# Patient Record
Sex: MALE | Race: WHITE | Employment: OTHER | URBAN - METROPOLITAN AREA
[De-identification: names, ages, dates, MRNs, and addresses within clinical notes are randomized per-mention and may not be internally consistent; named-entity substitution may affect disease eponyms.]

---

## 2017-03-09 ENCOUNTER — HOSPITAL ENCOUNTER (OUTPATIENT)
Dept: PHYSICAL THERAPY | Age: 72
Discharge: HOME | End: 2017-03-09
Payer: MEDICARE

## 2017-03-09 PROCEDURE — G8979 MOBILITY GOAL STATUS: HCPCS

## 2017-03-09 PROCEDURE — 97110 THERAPEUTIC EXERCISES: CPT

## 2017-03-09 PROCEDURE — G8978 MOBILITY CURRENT STATUS: HCPCS

## 2017-03-09 PROCEDURE — 97162 PT EVAL MOD COMPLEX 30 MIN: CPT

## 2017-03-09 NOTE — PROGRESS NOTES
PT DAILY TREATMENT NOTE - [facility] 3-16    Patient Name: Allison Montez  Date:3/9/2017  : 1945  [x]  Patient  Verified  Payor: Radha Guzman / Plan: [place] & B / Product Type: Medicare /    In time:4:10  Out time:4:50  Total Treatment Time (min): 40  Total Timed Codes (min): 8  1:1 Treatment Time ( only): 40   Visit #: 1 of 8    Treatment Area: Right knee pain [M25.561]    SUBJECTIVE  Pain Level (0-10 scale): 1/10  Any medication changes, allergies to medications, adverse drug reactions, diagnosis change, or new procedure performed?: [x] No    [] Yes (see summary sheet for update)  Subjective functional status/changes:   [x] See paper initial evaluation form in patient chart      OBJECTIVE    32 min [x]Eval                  []Re-Eval     8 min Therapeutic Exercise:  [] See flow sheet : HEP given and reviewed with Pt   Rationale: increase ROM and increase strength to improve the patients ability to gain full R knee AROM, increase ease with stairs          With   [x] TE   [] TA   [] neuro   [] other: Patient Education: [x] Review HEP    [] Progressed/Changed HEP based on:   [] positioning   [] body mechanics   [] transfers   [] heat/ice application    [] other:      Other Objective/Functional Measures: FOTO 42 pts     Pain Level (0-10 scale) post treatment: 1/10    ASSESSMENT/Changes in Function:     Patient will continue to benefit from skilled PT services to address functional mobility deficits, address ROM deficits, address strength deficits, analyze and address soft tissue restrictions, analyze and cue movement patterns, analyze and modify body mechanics/ergonomics, address imbalance/dizziness and instruct in home and community integration to attain remaining goals.      [x]  See Plan of Care         PLAN  []  Upgrade activities as tolerated     [x]  Continue plan of care  []  Update interventions per flow sheet       []  Discharge due to:_  []  Other:_      Radames Colmenares, PT 3/9/2017  5:52 PM

## 2017-03-09 NOTE — PROGRESS NOTES
[facility] - [facility]  Carlee Jeffers [address]  [phone] fax  Plan of Care/ Statement of Necessity for Physical Therapy Services    Patient name: Chelsea Berrios Start of Care: 3/9/2017   Referral source: Geovanna Pelletier MD : 1945    Medical Diagnosis: Right knee pain [M25.561]   Onset Date:16 (surgery)    Treatment Diagnosis: Right knee pain, LE Weakness   Prior Hospitalization: see medical history Provider#: [id number]   Medications: Verified on Patient summary List    Comorbidities: Heart disease - quadruple bypass surgery   Prior Level of Function: lives in [facility] with spouse;       The Plan of Care and following information is based on the information from the initial evaluation. Assessment/ key information: Pt is a 67 y.o. male who presents with c/o limited R knee AROM, mild pain with increased activity, and R LE weakness. On 16, Pt slipped on icy porch step and fell. Pt went to urgent care where x-rays were negative for fx. Pt called and consulted with Orthopedic surgeon and was dx with quadriceps tendon rupture. Surgery was performed on 16. Pt was on crutches x 7 wks then in post-op brace, WBAT. Pt now amb without brace, FWB and quadricep is fully healed. Pt continues to exhibit R quadriceps atrophy and is limited in R knee flex AROM. Pt has difficulty ascending/descending stairs, getting in/out of cars, and standing/amb prolonged periods of time. Upon exam, Pt exhibited fascial restrictions in R quadriceps, HS mm; inflexibility in same muscles; visible muscle atrophy; hypomobility of R patella; mild R knee jt effusion; taut R ITB; limited R knee AROM of 0-90 deg compared to 0-120 deg L; inability to perform single leg HR on R LE without increased compensation; instability with R SLS; and difficulty with double leg squatting.   Pt would benefit from skilled PT to address above deficits to improve Pt's function without difficulty or pain. Evaluation Complexity History MEDIUM  Complexity : 1-2 comorbidities / personal factors will impact the outcome/ POC ; Examination MEDIUM Complexity : 3 Standardized tests and measures addressing body structure, function, activity limitation and / or participation in recreation  ;Presentation MEDIUM Complexity : Evolving with changing characteristics  ; Clinical Decision Making MEDIUM Complexity : FOTO score of 26-74  Overall Complexity Rating: MEDIUM  Problem List: pain affecting function, decrease ROM, decrease strength, edema affecting function, impaired gait/ balance, decrease ADL/ functional abilitiies, decrease activity tolerance and decrease flexibility/ joint mobility   Treatment Plan may include any combination of the following: Therapeutic exercise, Therapeutic activities, Neuromuscular re-education, Physical agent/modality, Gait/balance training, Manual therapy and Patient education  Patient / Family readiness to learn indicated by: asking questions, trying to perform skills and interest  Persons(s) to be included in education: patient (P)  Barriers to Learning/Limitations: None  Patient Goal (s): Strength and range of motion.   Patient Self Reported Health Status: good  Rehabilitation Potential: good    Short Term Goals: To be accomplished in 1 weeks:  Goal: Pt to be compliant with initial HEP to improve R knee AROM. Status at last note/certification: Established    Long Term Goals: To be accomplished in 4 weeks:  Goal: Pt to increase R knee AROM to 0-110 deg or > to increase ease with stair negotiation and transfers in/out of car. Status at last note/certification: 2-98 deg R knee AROM  Goal: Pt to perform full, pain free double leg squat to increase ease with picking up objects from floor.   Status at last note/certification: unable to perform without mild pain, deviations  Goal: Pt to perform R SLS x 30 sec on foam without LOB to increase ease with stair negotiation. Status at last note/certification: instability, unable to perform  Goal: Pt to perform 15 single leg heel raises R LE without deviations to increase ease with household chores. Status at last note/certification: unable to perform without compensations, UE support    Frequency / Duration: Patient to be seen 2 times per week for 4 weeks. Patient/ Caregiver education and instruction: Diagnosis, prognosis, exercises   [x]  Plan of care has been reviewed with PTA    G-Codes (GP)  Mobility   Current  CK= 40-59%   Goal  CJ= 20-39%    The severity rating is based on clinical judgment and the FOTO score. Certification Period: 3/9/17 - 4/7/17    Renita Wolfe, PT 3/9/2017 5:55 PM  _____________________________________________________________________  I certify that the above Therapy Services are being furnished while the patient is under my care. I agree with the treatment plan and certify that this therapy is necessary.     Physician's Signature:____________________  Date:__________Time:______    Please sign and return to [facility] - [address]  [phone] fax

## 2017-03-14 ENCOUNTER — HOSPITAL ENCOUNTER (OUTPATIENT)
Dept: PHYSICAL THERAPY | Age: 72
Discharge: HOME | End: 2017-03-14
Payer: MEDICARE

## 2017-03-14 PROCEDURE — 97110 THERAPEUTIC EXERCISES: CPT

## 2017-03-14 PROCEDURE — 97140 MANUAL THERAPY 1/> REGIONS: CPT

## 2017-03-14 NOTE — PROGRESS NOTES
PT DAILY TREATMENT NOTE - [facility]     Patient Name: Keara Counter  Date:3/14/2017  : 1945  [x]  Patient  Verified  Payor: Ghulamteddy Honey / Plan: [place] MEDICARE PART A & B / Product Type: Medicare /    In time:4:38  Out time:5:30  Total Treatment Time (min): 52  Total Timed Codes (min): 52  1:1 Treatment Time ( [address] only): 46   Visit #: 2 of 8    Treatment Area: Right knee pain [M25.561]    SUBJECTIVE  Pain Level (0-10 scale): 1/10  Any medication changes, allergies to medications, adverse drug reactions, diagnosis change, or new procedure performed?: [x] No    [] Yes (see summary sheet for update)  Subjective functional status/changes:   [] No changes reported  \"I am fine. We just moved here so we are still unpacking and I was doing a lot of setting up yesterday and that kind of strained the thigh. \"    OBJECTIVE    37 min Therapeutic Exercise:  [] See flow sheet :   Rationale: increase ROM and increase strength to improve the patients ability to have full knee flex AROM with movement    15 min Manual Therapy:  STM R quad, ITB; patellar mobs, post tibiofemoral glides   Rationale: decrease pain, increase ROM, increase tissue extensibility and decrease trigger points to normalize ROM, gait          With   [] TE   [] TA   [] neuro   [] other: Patient Education: [x] Review HEP    [] Progressed/Changed HEP based on:   [] positioning   [] body mechanics   [] transfers   [] heat/ice application    [] other:      Other Objective/Functional Measures: Initiated Rx program per flow sheet. Pt given VCs for proper technique of all exercises. Pain Level (0-10 scale) post treatment: 3/10    ASSESSMENT/Changes in Function: Pt noted no increase in pain or strain of R quad. Will continue to progress program to increase ROM, strength.     Patient will continue to benefit from skilled PT services to address functional mobility deficits, address ROM deficits, address strength deficits, analyze and address soft tissue restrictions, analyze and cue movement patterns, analyze and modify body mechanics/ergonomics, address imbalance/dizziness and instruct in home and community integration to attain remaining goals. []  See Plan of Care  []  See progress note/recertification  []  See Discharge Summary         Progress towards goals / Updated goals:  Short Term Goals: To be accomplished in 1 weeks:  Goal: Pt to be compliant with initial HEP to improve R knee AROM. Status at last note/certification: Established  Current status: met  Long Term Goals: To be accomplished in 4 weeks:  Goal: Pt to increase R knee AROM to 0-110 deg or > to increase ease with stair negotiation and transfers in/out of car. Status at last note/certification: 8-72 deg R knee AROM  Goal: Pt to perform full, pain free double leg squat to increase ease with picking up objects from floor. Status at last note/certification: unable to perform without mild pain, deviations  Goal: Pt to perform R SLS x 30 sec on foam without LOB to increase ease with stair negotiation. Status at last note/certification: instability, unable to perform  Goal: Pt to perform 15 single leg heel raises R LE without deviations to increase ease with household chores.   Status at last note/certification: unable to perform without compensations, UE support    PLAN  []  Upgrade activities as tolerated     [x]  Continue plan of care  []  Update interventions per flow sheet       []  Discharge due to:_  []  Other:_      Renita Wolfe, PT 3/14/2017  6:50 PM    Future Appointments  Date Time Provider Avis Greco   3/17/2017 3:30 PM Tavares Berry, PT [facility]   3/21/2017 4:00 PM Renita Wolfe PT [facility]   3/24/2017 3:30 PM Tavares Berry, PT [facility]   3/27/2017 3:00  [address] [facility]   3/31/2017 3:30 PM Tavares Berry, PT [facility]

## 2017-03-17 ENCOUNTER — HOSPITAL ENCOUNTER (OUTPATIENT)
Dept: PHYSICAL THERAPY | Age: 72
Discharge: HOME | End: 2017-03-17
Payer: MEDICARE

## 2017-03-17 PROCEDURE — 97110 THERAPEUTIC EXERCISES: CPT

## 2017-03-17 PROCEDURE — 97140 MANUAL THERAPY 1/> REGIONS: CPT

## 2017-03-17 NOTE — PROGRESS NOTES
PT DAILY TREATMENT NOTE - [facility]     Patient Name: Whitney Walton  Date:3/17/2017  : 1945  [x]  Patient  Verified  Payor: Carleen Escamilla / Plan: [place] MEDICARE PART A & B / Product Type: Medicare /    In time:335  Out time:434  Total Treatment Time (min): 59  Total Timed Codes (min): 49  1:1 Treatment Time ( [address] only): 27  Visit #: 3 of 8    Treatment Area: Right knee pain [M25.561]    SUBJECTIVE  Pain Level (0-10 scale): 1  Any medication changes, allergies to medications, adverse drug reactions, diagnosis change, or new procedure performed?: [x] No    [] Yes (see summary sheet for update)  Subjective functional status/changes:   [] No changes reported  I don't usually have pain.     OBJECTIVE    Modality rationale: decrease inflammation and decrease pain to improve the patients ability to improve activity tolerance   Min Type Additional Details    [] Estim:  []Unatt       []IFC  []Premod                        []Other:  []w/ice   []w/heat  Position:  Location:    [] Estim: []Att    []TENS instruct  []NMES                    []Other:  []w/US   []w/ice   []w/heat  Position:  Location:    []  Traction: [] Cervical       []Lumbar                       [] Prone          []Supine                       []Intermittent   []Continuous Lbs:  [] before manual  [] after manual    []  Ultrasound: []Continuous   [] Pulsed                           []1MHz   []3MHz W/cm2:  Location:    []  Iontophoresis with dexamethasone         Location: [] Take home patch   [] In clinic   10 [x]  Ice     []  heat  []  Ice massage  []  Laser   []  Anodyne Position:supine  Location:R knee    []  Laser with stim  []  Other:  Position:  Location:    []  Vasopneumatic Device Pressure:       [] lo [] med [] hi   Temperature: [] lo [] med [] hi   [] Skin assessment post-treatment:  []intact []redness- no adverse reaction    []redness - adverse reaction:     39 min Therapeutic Exercise:  [x] See flow sheet :   Rationale: increase ROM and increase strength to improve the patients ability to improve ease of mobility     10 min Manual Therapy:  STM Right distal quads, TFL/ITB, inferior patellar mobs/posterior tibiofemoral joint mobs for flexion   Rationale: decrease pain, increase ROM, increase tissue extensibility and decrease trigger points to normalize gait      With   [] TE   [] TA   [] neuro   [] other: Patient Education: [x] Review HEP    [] Progressed/Changed HEP based on:   [] positioning   [] body mechanics   [] transfers   [] heat/ice application    [] other:      Other Objective/Functional Measures: Right knee flexion AROM after manual: 107     Pain Level (0-10 scale) post treatment: 1    ASSESSMENT/Changes in Function: Patient demonstrates gains in ROM. Good stability with BOSU lunges without UE support. Patient will continue to benefit from skilled PT services to modify and progress therapeutic interventions, address functional mobility deficits, address ROM deficits, address strength deficits, analyze and address soft tissue restrictions, analyze and cue movement patterns, analyze and modify body mechanics/ergonomics, assess and modify postural abnormalities, address imbalance/dizziness and instruct in home and community integration to attain remaining goals. []  See Plan of Care  []  See progress note/recertification  []  See Discharge Summary         Progress towards goals / Updated goals:  Short Term Goals: To be accomplished in 1 weeks:  Goal: Pt to be compliant with initial HEP to improve R knee AROM. Status at last note/certification: Established  Current status: met  Long Term Goals: To be accomplished in 4 weeks:  Goal: Pt to increase R knee AROM to 0-110 deg or > to increase ease with stair negotiation and transfers in/out of car.   Status at last note/certification: 5-57 deg R knee AROM   Current status: Progressing, 0-107  Goal: Pt to perform full, pain free double leg squat to increase ease with picking up objects from floor.  Status at last note/certification: unable to perform without mild pain, deviations  Current status: Met  Goal: Pt to perform R SLS x 30 sec on foam without LOB to increase ease with stair negotiation. Status at last note/certification: instability, unable to perform  Current status: Progressing, unable for more than 1-2 seconds  Goal: Pt to perform 15 single leg heel raises R LE without deviations to increase ease with household chores.   Status at last note/certification: unable to perform without compensations, UE support  Current status: Unable, unable to perform full heel raise    PLAN  [x]  Upgrade activities as tolerated     [x]  Continue plan of care  []  Update interventions per flow sheet       []  Discharge due to:_  []  Other:_      Roxanne Barahona PT 3/17/2017  1:36 PM    Future Appointments  Date Time Provider Avis Greco   3/17/2017 3:30 PM Roxanne Barahona PT [facility]   3/21/2017 4:00 PM Renita Wolfe [facility]   3/24/2017 3:30  [address] [facility]   3/27/2017 3:00  [address] [facility]   3/31/2017 3:30 PM Roxanne Barahona [facility]

## 2017-03-21 ENCOUNTER — HOSPITAL ENCOUNTER (OUTPATIENT)
Dept: PHYSICAL THERAPY | Age: 72
Discharge: HOME | End: 2017-03-21
Payer: MEDICARE

## 2017-03-21 PROCEDURE — 97140 MANUAL THERAPY 1/> REGIONS: CPT

## 2017-03-21 PROCEDURE — 97110 THERAPEUTIC EXERCISES: CPT

## 2017-03-21 NOTE — PROGRESS NOTES
PT DAILY TREATMENT NOTE - [facility]     Patient Name: Samantha Gomez  Date:3/21/2017  : 1945  [x]  Patient  Verified  Payor: Do Toribio / Plan: [place] MEDICARE PART A & B / Product Type: Medicare /    In time:4:00  Out time:5:10  Total Treatment Time (min): 70  Total Timed Codes (min): 60  1:1 Treatment Time ( [address] only): 60  Visit #: 4 of 8    Treatment Area: Right knee pain [M25.561]    SUBJECTIVE  Pain Level (0-10 scale): 1/10  Any medication changes, allergies to medications, adverse drug reactions, diagnosis change, or new procedure performed?: [x] No    [] Yes (see summary sheet for update)  Subjective functional status/changes:   [] No changes reported  \"I did too much yesterday. I worked on it twice yesterday. I think from now on I will do the workout just once a day because it causes my leg to be really sore. \"    OBJECTIVE    Modality rationale: decrease inflammation and decrease pain to improve the patients ability to improve activity tolerance   Min Type Additional Details    [] Estim:  []Unatt       []IFC  []Premod                        []Other:  []w/ice   []w/heat  Position:  Location:    [] Estim: []Att    []TENS instruct  []NMES                    []Other:  []w/US   []w/ice   []w/heat  Position:  Location:    []  Traction: [] Cervical       []Lumbar                       [] Prone          []Supine                       []Intermittent   []Continuous Lbs:  [] before manual  [] after manual    []  Ultrasound: []Continuous   [] Pulsed                           []1MHz   []3MHz W/cm2:  Location:    []  Iontophoresis with dexamethasone         Location: [] Take home patch   [] In clinic   10 [x]  Ice     []  heat  []  Ice massage  []  Laser   []  Anodyne Position:supine  Location:R knee    []  Laser with stim  []  Other:  Position:  Location:    []  Vasopneumatic Device Pressure:       [] lo [] med [] hi   Temperature: [] lo [] med [] hi   [] Skin assessment post-treatment:  []intact []redness- no adverse reaction    []redness - adverse reaction:     50 min Therapeutic Exercise:  [x] See flow sheet :   Rationale: increase ROM and increase strength to improve the patients ability to improve ease of mobility     10 min Manual Therapy:  STM R distal quad, ITB, HS; post tibiofemoral glides; patellar mobs   Rationale: decrease pain, increase ROM, increase tissue extensibility and decrease trigger points to normalize gait      With   [] TE   [] TA   [] neuro   [] other: Patient Education: [x] Review HEP    [] Progressed/Changed HEP based on:   [] positioning   [] body mechanics   [] transfers   [] heat/ice application    [] other:      Other Objective/Functional Measures: R knee AROM 0-109 deg    Pain Level (0-10 scale) post treatment: 0/10    ASSESSMENT/Changes in Function: Pt able to perform all exercises in standing except step ups without UE support and good stability. Pt continues to exhibit gains in R knee AROM. Patient will continue to benefit from skilled PT services to modify and progress therapeutic interventions, address functional mobility deficits, address ROM deficits, address strength deficits, analyze and address soft tissue restrictions, analyze and cue movement patterns, analyze and modify body mechanics/ergonomics, assess and modify postural abnormalities, address imbalance/dizziness and instruct in home and community integration to attain remaining goals. []  See Plan of Care  []  See progress note/recertification  []  See Discharge Summary         Progress towards goals / Updated goals:  Short Term Goals: To be accomplished in 1 weeks:  Goal: Pt to be compliant with initial HEP to improve R knee AROM. Status at last note/certification: Established  Current status: met  Long Term Goals: To be accomplished in 4 weeks:  Goal: Pt to increase R knee AROM to 0-110 deg or > to increase ease with stair negotiation and transfers in/out of car.   Status at last note/certification: 4-06 deg R knee AROM   Current status: progressing - R knee 0-109 deg  Goal: Pt to perform full, pain free double leg squat to increase ease with picking up objects from floor. Status at last note/certification: unable to perform without mild pain, deviations  Current status: Met  Goal: Pt to perform R SLS x 30 sec on foam without LOB to increase ease with stair negotiation. Status at last note/certification: instability, unable to perform  Current status: Progressing, unable for more than 1-2 seconds  Goal: Pt to perform 15 single leg heel raises R LE without deviations to increase ease with household chores.   Status at last note/certification: unable to perform without compensations, UE support  Current status: not met - unable to perform full heel raise    PLAN  [x]  Upgrade activities as tolerated     [x]  Continue plan of care  []  Update interventions per flow sheet       []  Discharge due to:_  []  Other:_      Renita Wolfe, PT 3/21/2017  1:36 PM    Future Appointments  Date Time Provider Avis Greco   3/23/2017 12:00  [address] [facility]   3/27/2017 3:00  [address] [facility]   3/31/2017 3:30 PM Camilo Varela, PT [facility]

## 2017-03-23 ENCOUNTER — HOSPITAL ENCOUNTER (OUTPATIENT)
Dept: PHYSICAL THERAPY | Age: 72
Discharge: HOME | End: 2017-03-23
Payer: MEDICARE

## 2017-03-23 PROCEDURE — 97140 MANUAL THERAPY 1/> REGIONS: CPT

## 2017-03-23 PROCEDURE — 97110 THERAPEUTIC EXERCISES: CPT

## 2017-03-23 NOTE — PROGRESS NOTES
PT DAILY TREATMENT NOTE - [facility] 316    Patient Name: Julianne Camarillo  Date:3/23/2017  : 1945  [x]  Patient  Verified  Payor: Monik Holland / Plan: [place] & [place] / Product Type: Medicare /    In time:12:10  Out time: 1;05  Total Treatment Time (min): 55  Total Timed Codes (min): 45  1:1 Treatment Time ( [address] only): 30   Visit #: 5 of 8    Treatment Area: Right knee pain [M25.561]    SUBJECTIVE  Pain Level (0-10 scale):  0  Any medication changes, allergies to medications, adverse drug reactions, diagnosis change, or new procedure performed?: [x] No    [] Yes (see summary sheet for update)  Subjective functional status/changes:   [] No changes reported  Stairs are still difficulty, especially going down steps    OBJECTIVE    Modality rationale: decrease pain to improve the patients ability to tolerate  activity   Min Type Additional Details    [] Estim:  []Unatt       []IFC  []Premod                        []Other:  []w/ice   []w/heat  Position:  Location:    [] Estim: []Att    []TENS instruct  []NMES                    []Other:  []w/US   []w/ice   []w/heat  Position:  Location:    []  Traction: [] Cervical       []Lumbar                       [] Prone          []Supine                       []Intermittent   []Continuous Lbs:  [] before manual  [] after manual    []  Ultrasound: []Continuous   [] Pulsed                           []1MHz   []3MHz Location:  W/cm2:    []  Iontophoresis with dexamethasone         Location: [] Take home patch   [] In clinic   10 [x]  Ice     []  heat  []  Ice massage  []  Laser   []  Anodyne Position: supine  Location: R knee    []  Laser with stim  []  Other: Position:  Location:    []  Vasopneumatic Device Pressure:       [] lo [] med [] hi   Temperature: [] lo [] med [] hi   [x] Skin assessment post-treatment:  [x]intact []redness- no adverse reaction    []redness - adverse reaction:       35 min Therapeutic Exercise:  [] See flow sheet :   Rationale: increase ROM and increase strength to improve the patients ability to walk and negotiate stairs    10 min Manual Therapy:  STM to distal quad, ITB,  Manual stretching patellar mobs   Rationale: increase ROM and increase tissue extensibility to improve ease with stairs         With   [] TE   [] TA   [] neuro   [] other: Patient Education: [x] Review HEP    [] Progressed/Changed HEP based on:   [] positioning   [] body mechanics   [] transfers   [] heat/ice application    [] other:      Other Objective/Functional Measures:      Pain Level (0-10 scale) post treatment:  0    ASSESSMENT/Changes in Function: decreased tightness in distal quad and ITB. Eccentric quad strength improving with decreased UE support required    Patient will continue to benefit from skilled PT services to modify and progress therapeutic interventions, address functional mobility deficits, address ROM deficits, address strength deficits, analyze and address soft tissue restrictions, analyze and cue movement patterns, analyze and modify body mechanics/ergonomics, assess and modify postural abnormalities, address imbalance/dizziness and instruct in home and community integration to attain remaining goals. []  See Plan of Care  []  See progress note/recertification  []  See Discharge Summary         Progress towards goals / Updated goals:  Goal: Pt to be compliant with initial HEP to improve R knee AROM. Status at last note/certification: Established  Current status: met  Long Term Goals: To be accomplished in 4 weeks:  Goal: Pt to increase R knee AROM to 0-110 deg or > to increase ease with stair negotiation and transfers in/out of car. Status at last note/certification: 2-05 deg R knee AROM   Current status: progressing - R knee 0-109 deg  Goal: Pt to perform full, pain free double leg squat to increase ease with picking up objects from floor.   Status at last note/certification: unable to perform without mild pain, deviations  Current status: Met  Goal: Pt to perform R SLS x 30 sec on foam without LOB to increase ease with stair negotiation. Status at last note/certification: instability, unable to perform  Current status: Progressing, unable for more than 1-2 seconds  Goal: Pt to perform 15 single leg heel raises R LE without deviations to increase ease with household chores.   Status at last note/certification: unable to perform without compensations, UE support  Current status: not met - unable to perform full heel raise    PLAN  [x]  Upgrade activities as tolerated     []  Continue plan of care  []  Update interventions per flow sheet       []  Discharge due to:_  []  Other:_      Zofia Tinsley, PTA 3/23/2017  12:15 PM

## 2017-03-24 ENCOUNTER — APPOINTMENT (OUTPATIENT)
Dept: PHYSICAL THERAPY | Age: 72
End: 2017-03-24
Payer: MEDICARE

## 2017-03-27 ENCOUNTER — HOSPITAL ENCOUNTER (OUTPATIENT)
Dept: PHYSICAL THERAPY | Age: 72
Discharge: HOME | End: 2017-03-27
Payer: MEDICARE

## 2017-03-27 PROCEDURE — 97110 THERAPEUTIC EXERCISES: CPT

## 2017-03-27 PROCEDURE — 97140 MANUAL THERAPY 1/> REGIONS: CPT

## 2017-03-27 NOTE — PROGRESS NOTES
PT DAILY TREATMENT NOTE     Patient Name: Heaven Michaud  Date:3/27/2017  : 1945  [x]  Patient  Verified  Payor: Isabel Jacques / Plan: [place] MEDICARE PART A & B / Product Type: Medicare /    In time:3:00  Out time: 4:05  Total Treatment Time (min): 65  Total Timed Codes (min): 55  1:1 Treatment Time ( [address] only): 30   Visit #: 6 of 8    Treatment Area: Right knee pain [M25.561]    SUBJECTIVE  Pain Level (0-10 scale): 1  Any medication changes, allergies to medications, adverse drug reactions, diagnosis change, or new procedure performed?: [x] No    [] Yes (see summary sheet for update)  Subjective functional status/changes:   [] No changes reported  I'm having some more soreness in my knee, not bad though.     OBJECTIVE    Modality rationale: decrease inflammation and decrease pain to improve the patients ability to tolerate activity   Min Type Additional Details    [] Estim:  []Unatt       []IFC  []Premod                        []Other:  []w/ice   []w/heat  Position:  Location:    [] Estim: []Att    []TENS instruct  []NMES                    []Other:  []w/US   []w/ice   []w/heat  Position:  Location:    []  Traction: [] Cervical       []Lumbar                       [] Prone          []Supine                       []Intermittent   []Continuous Lbs:  [] before manual  [] after manual    []  Ultrasound: []Continuous   [] Pulsed                           []1MHz   []3MHz W/cm2:  Location:    []  Iontophoresis with dexamethasone         Location: [] Take home patch   [] In clinic   10 [x]  Ice     []  heat  []  Ice massage  []  Laser   []  Anodyne Position: supine  Location: R knee    []  Laser with stim  []  Other:  Position:  Location:    []  Vasopneumatic Device Pressure:       [] lo [] med [] hi   Temperature: [] lo [] med [] hi   [x] Skin assessment post-treatment:  [x]intact []redness- no adverse reaction    []redness - adverse reaction:       45 min Therapeutic Exercise:  [] See flow sheet :   Rationale: increase ROM and increase strength to improve the patients ability to walk and negotiate stairs    10 min Manual Therapy:  STM to distal ITB and Quad,  TF mobs   Rationale: decrease pain, increase ROM and increase tissue extensibility to improve ease of walking       With   [] TE   [] TA   [] neuro   [] other: Patient Education: [x] Review HEP    [] Progressed/Changed HEP based on:   [] positioning   [] body mechanics   [] transfers   [] heat/ice application    [] other:      Other Objective/Functional Measures:      Pain Level (0-10 scale) post treatment:  0    ASSESSMENT/Changes in Function: Continues to demo instability with SL  HR and unable to do without Significant UE support. Patient will continue to benefit from skilled PT services to modify and progress therapeutic interventions, address functional mobility deficits, address ROM deficits, address strength deficits, analyze and address soft tissue restrictions, analyze and cue movement patterns, analyze and modify body mechanics/ergonomics, assess and modify postural abnormalities, address imbalance/dizziness and instruct in home and community integration to attain remaining goals. []  See Plan of Care  []  See progress note/recertification  []  See Discharge Summary         Progress towards goals / Updated goals:  Goal: Pt to be compliant with initial HEP to improve R knee AROM. Status at last note/certification: Established  Current status: met  Long Term Goals: To be accomplished in 4 weeks:  Goal: Pt to increase R knee AROM to 0-110 deg or > to increase ease with stair negotiation and transfers in/out of car. Status at last note/certification: 5-43 deg R knee AROM   Current status: progressing - R knee 0-109 deg  Goal: Pt to perform full, pain free double leg squat to increase ease with picking up objects from floor.   Status at last note/certification: unable to perform without mild pain, deviations  Current status: Met  Goal: Pt to perform R SLS x 30 sec on foam without LOB to increase ease with stair negotiation. Status at last note/certification: instability, unable to perform  Current status: Progressing, unable for more than 1-2 seconds  Goal: Pt to perform 15 single leg heel raises R LE without deviations to increase ease with household chores.   Status at last note/certification: unable to perform without compensations, UE support  Current status: not met - unable to perform full heel raise    PLAN  [x]  Upgrade activities as tolerated     []  Continue plan of care  []  Update interventions per flow sheet       []  Discharge due to:_  []  Other:_      Zofia Newnicholas, PTA 3/27/2017  3:54 PM    Future Appointments  Date Time Provider Avis Greco   3/31/2017 3:30 PM Rohit Dodge, [facility]

## 2017-03-28 ENCOUNTER — APPOINTMENT (OUTPATIENT)
Dept: PHYSICAL THERAPY | Age: 72
End: 2017-03-28
Payer: MEDICARE

## 2017-03-31 ENCOUNTER — HOSPITAL ENCOUNTER (OUTPATIENT)
Dept: PHYSICAL THERAPY | Age: 72
Discharge: HOME | End: 2017-03-31
Payer: MEDICARE

## 2017-03-31 PROCEDURE — 97140 MANUAL THERAPY 1/> REGIONS: CPT

## 2017-03-31 PROCEDURE — G8978 MOBILITY CURRENT STATUS: HCPCS

## 2017-03-31 PROCEDURE — 97110 THERAPEUTIC EXERCISES: CPT

## 2017-03-31 PROCEDURE — G8979 MOBILITY GOAL STATUS: HCPCS

## 2017-03-31 NOTE — PROGRESS NOTES
[facility] - [place] [address] [address]  [phone] fax    Continued Plan of Care/ Re-certification for Physical Therapy Services      Patient name: Ashley Agrawal Start of Care: 3/9/2017   Referral source: Kentrell Domínguez MD : 1945    Medical Diagnosis: Right knee pain [M25.561] Onset Date:16 (surgery)    Treatment Diagnosis: Right knee pain, LE Weakness   Prior Hospitalization: see medical history Provider#: [id number]   Medications: Verified on Patient summary List   Comorbidities: Heart disease - quadruple bypass surgery  Prior Level of Function: lives in [facility] with spouse;     Visits from Start of Care: 7    Missed Visits: 0    The Plan of Care and following information is based on the patient's current status:  Goal: Pt to be compliant with initial HEP to improve R knee AROM. Status at last note/certification: Established  Current status: met  Long Term Goals: To be accomplished in 4 weeks:  Goal: Pt to increase R knee AROM to 0-110 deg or > to increase ease with stair negotiation and transfers in/out of car. Status at last note/certification: 2-54 deg R knee AROM   Current status: Met, 0-114  Goal: Pt to perform full, pain free double leg squat to increase ease with picking up objects from floor. Status at last note/certification: unable to perform without mild pain, deviations  Current status: Met  Goal: Pt to perform R SLS x 30 sec on foam without LOB to increase ease with stair negotiation. Status at last note/certification: instability, unable to perform  Current status: Progressing, unable for 30 seconds  Goal: Pt to perform 15 single leg heel raises R LE without deviations to increase ease with household chores.   Status at last note/certification: unable to perform without compensations, UE support  Current status: Progressing, limited heel clearance from floor however similar height as noninvolved LE    Key functional changes:   Functional Gains: improved ROM, limited pain, improved mobility/ambulation  Functional Deficits: some difficulty with stairs especially with descending, some difficulty with transfers (more reliance on Left LE), limits standing/walking times subconsciously   % improvement: 70%  Pain   Average: 0-1/10       Best: 0/10     Worst: 1/10  Patient Goal: \"Walk up and down stairs\"      Problems/ barriers to goal attainment: none     Problem List: pain affecting function, decrease ROM, decrease strength, edema affecting function, impaired gait/ balance, decrease ADL/ functional abilitiies, decrease activity tolerance, decrease flexibility/ joint mobility and decrease transfer abilities    Treatment Plan: Therapeutic exercise, Therapeutic activities, Neuromuscular re-education, Physical agent/modality, Gait/balance training, Manual therapy, Aquatic therapy, Patient education, Self Care training, Functional mobility training, Home safety training and Stair training     Goals for this certification period to be accomplished in 10 treatments:  Goal: Patient will improve FOTO assessment score to 62 in order to indicate improved functional abilities. Status at last note/certification: 48  Goal: Patient will report no challenge with negotiating stairs with reciprocal step pattern with use of handrail in order to progress toward personal goals. Status at last note/certification: Step to  Goal: Patient will report no limitations to standing/ambulating/transferring due to Right knee pain/weakness in order to progress toward premorbid status. Status at last note/certification: Limits stand/amb, reports some difficulty with transfers    Frequency / Duration: Patient to be seen 2 times per week for 5 weeks:    Assessment / Recommendations:Patient progressing well after quad tendon repair. Improved Right knee ROM and strength.  Will benefit from continued skilled physical therapy in order to progress knee stability and balance and to improve ease of stair negotiation. G-Codes (GP)  Mobility  [id number] Current  CK= 40-59%  [id number] Goal  CJ= 20-39%    The severity rating is based on clinical judgment and the FOTO score. Certification Period: 4/1/17 to 4/30/17    Dala , PT 3/31/2017 1:41 PM    ________________________________________________________________________  I certify that the above Therapy Services are being furnished while the patient is under my care. I agree with the treatment plan and certify that this therapy is necessary. [] I have read the above and request that my patient continue as recommended.   [] I have read the above report and request that my patient continue therapy with the following changes/special instructions: ______________________________________  [] I have read the above report and request that my patient be discharged from therapy    Physician's Signature:_______________________________Date:___________Time:__________  Please sign and return to [facility] - [address]  [phone] fax

## 2017-03-31 NOTE — PROGRESS NOTES
PT DAILY TREATMENT NOTE - [facility]     Patient Name: Dewey Burnham  Date:3/31/2017  : 1945  [x]  Patient  Verified  Payor: Misty Childers / Plan: [place] MEDICARE PART A & B / Product Type: Medicare /    In time:325  Out time:431  Total Treatment Time (min): 66  Total Timed Codes (min): 56  1:1 Treatment Time ( [address] only): 64   Visit #: 7 of 8    Treatment Area: Right knee pain [M25.561]    SUBJECTIVE  Pain Level (0-10 scale): 1  Any medication changes, allergies to medications, adverse drug reactions, diagnosis change, or new procedure performed?: [x] No    [] Yes (see summary sheet for update)  Subjective functional status/changes:   [] No changes reported  I'm still having trouble with stairs.      OBJECTIVE    Modality rationale: decrease inflammation and decrease pain to improve the patients ability to improve activity tolerance   Min Type Additional Details    [] Estim:  []Unatt       []IFC  []Premod                        []Other:  []w/ice   []w/heat  Position:  Location:    [] Estim: []Att    []TENS instruct  []NMES                    []Other:  []w/US   []w/ice   []w/heat  Position:  Location:    []  Traction: [] Cervical       []Lumbar                       [] Prone          []Supine                       []Intermittent   []Continuous Lbs:  [] before manual  [] after manual    []  Ultrasound: []Continuous   [] Pulsed                           []1MHz   []3MHz W/cm2:  Location:    []  Iontophoresis with dexamethasone         Location: [] Take home patch   [] In clinic   10 [x]  Ice     []  heat  []  Ice massage  []  Laser   []  Anodyne Position:supine with wedge  Location:R knee    []  Laser with stim  []  Other:  Position:  Location:    []  Vasopneumatic Device Pressure:       [] lo [] med [] hi   Temperature: [] lo [] med [] hi   [] Skin assessment post-treatment:  []intact []redness- no adverse reaction    []redness - adverse reaction:     48 min Therapeutic Exercise:  [x] See flow sheet :   Rationale: increase ROM and increase strength to improve the patients ability to improve stand/amb tolerance    8 min Manual Therapy:  STM distal Right quads; TF mobs for flexion   Rationale: decrease pain, increase ROM, increase tissue extensibility and decrease trigger points to normalize gait          With   [] TE   [] TA   [] neuro   [] other: Patient Education: [x] Review HEP    [] Progressed/Changed HEP based on:   [] positioning   [] body mechanics   [] transfers   [] heat/ice application    [] other:      Other Objective/Functional Measures: Right knee flexion AROM 114     Pain Level (0-10 scale) post treatment: 1    ASSESSMENT/Changes in Function: Progressing well with ROM. Able to negotiate 4 steps with reciprocal step pattern; states he typically uses step to. Encouraged him to perform reciprocal when able, alternating with step to when sore, using HR in order to progress knee stability. Patient will continue to benefit from skilled PT services to modify and progress therapeutic interventions, address functional mobility deficits, address ROM deficits, address strength deficits, analyze and address soft tissue restrictions, analyze and cue movement patterns, analyze and modify body mechanics/ergonomics, assess and modify postural abnormalities, address imbalance/dizziness and instruct in home and community integration to attain remaining goals. []  See Plan of Care  []  See progress note/recertification  []  See Discharge Summary         Progress towards goals / Updated goals:  Goal: Pt to be compliant with initial HEP to improve R knee AROM. Status at last note/certification: Established  Current status: met  Long Term Goals: To be accomplished in 4 weeks:  Goal: Pt to increase R knee AROM to 0-110 deg or > to increase ease with stair negotiation and transfers in/out of car.   Status at last note/certification: 1-36 deg R knee AROM   Current status: Met, 0-114  Goal: Pt to perform full, pain free double leg squat to increase ease with picking up objects from floor. Status at last note/certification: unable to perform without mild pain, deviations  Current status: Met  Goal: Pt to perform R SLS x 30 sec on foam without LOB to increase ease with stair negotiation. Status at last note/certification: instability, unable to perform  Current status: Progressing, unable for 30 seconds  Goal: Pt to perform 15 single leg heel raises R LE without deviations to increase ease with household chores.   Status at last note/certification: unable to perform without compensations, UE support  Current status: Progressing, limited heel clearance from floor however similar height as noninvolved LE    PLAN  [x]  Upgrade activities as tolerated     [x]  Continue plan of care  []  Update interventions per flow sheet       []  Discharge due to:_  []  Other:_      Carol Fowler PT 3/31/2017  1:40 PM    Future Appointments  Date Time Provider Avis Greco   3/31/2017 3:30 PM Carol Fowler PT [facility]

## 2017-04-03 ENCOUNTER — HOSPITAL ENCOUNTER (OUTPATIENT)
Dept: PHYSICAL THERAPY | Age: 72
Discharge: HOME | End: 2017-04-03
Payer: MEDICARE

## 2017-04-03 PROCEDURE — 97140 MANUAL THERAPY 1/> REGIONS: CPT

## 2017-04-03 PROCEDURE — 97110 THERAPEUTIC EXERCISES: CPT

## 2017-04-03 NOTE — PROGRESS NOTES
PT DAILY TREATMENT NOTE - [facility] 316    Patient Name: Taylor Ganser  Date:4/3/2017  : 1945  [x]  Patient  Verified  Payor: Adama Chamber / Plan: [place] MEDICARE PART A & B / Product Type: Medicare /    In time:5:00  Out time: 5;50  Total Treatment Time (min): 50  Total Timed Codes (min): 40  1:1 Treatment Time ( only): 40   Visit #: 1 of 10    Treatment Area: Right knee pain [M25.561]    SUBJECTIVE  Pain Level (0-10 scale): 0  Any medication changes, allergies to medications, adverse drug reactions, diagnosis change, or new procedure performed?: [x] No    [] Yes (see summary sheet for update)  Subjective functional status/changes:   [] No changes reported  I think I did too many steps yesterday.     OBJECTIVE    Modality rationale: decrease pain to improve the patients ability to improve activity tolerance   Min Type Additional Details    [] Estim:  []Unatt       []IFC  []Premod                        []Other:  []w/ice   []w/heat  Position:  Location:    [] Estim: []Att    []TENS instruct  []NMES                    []Other:  []w/US   []w/ice   []w/heat  Position:  Location:    []  Traction: [] Cervical       []Lumbar                       [] Prone          []Supine                       []Intermittent   []Continuous Lbs:  [] before manual  [] after manual    []  Ultrasound: []Continuous   [] Pulsed                           []1MHz   []3MHz Location:  W/cm2:    []  Iontophoresis with dexamethasone         Location: [] Take home patch   [] In clinic   10 [x]  Ice     []  heat  []  Ice massage  []  Laser   []  Anodyne Position: supine  Location: R distal quad    []  Laser with stim  []  Other: Position:  Location:    []  Vasopneumatic Device Pressure:       [] lo [] med [] hi   Temperature: [] lo [] med [] hi   [x] Skin assessment post-treatment:  [x]intact []redness- no adverse reaction    []redness - adverse reaction:       32 min Therapeutic Exercise:  [] See flow sheet :   Rationale: increase ROM and increase strength to improve the patients ability to walk and negotiate stairs    8 min Manual Therapy:  STM to R distal quad, ITB   Rationale: increase ROM and increase tissue extensibility to improve ease with gait       With   [] TE   [] TA   [] neuro   [] other: Patient Education: [x] Review HEP    [] Progressed/Changed HEP based on:   [] positioning   [] body mechanics   [] transfers   [] heat/ice application    [] other:      Other Objective/Functional Measures:      Pain Level (0-10 scale) post treatment: 0    ASSESSMENT/Changes in Function:  Continued to be challenged with stair descent due to quad, LE weakness    Patient will continue to benefit from skilled PT services to modify and progress therapeutic interventions, address functional mobility deficits, address ROM deficits, address strength deficits, analyze and address soft tissue restrictions, analyze and cue movement patterns, analyze and modify body mechanics/ergonomics, assess and modify postural abnormalities, address imbalance/dizziness and instruct in home and community integration to attain remaining goals. []  See Plan of Care  []  See progress note/recertification  []  See Discharge Summary         Progress towards goals / Updated goals:  Goal: Patient will improve FOTO assessment score to 62 in order to indicate improved functional abilities. Status at last note/certification: 48  Goal: Patient will report no challenge with negotiating stairs with reciprocal step pattern with use of handrail in order to progress toward personal goals. Status at last note/certification: Step to  Goal: Patient will report no limitations to standing/ambulating/transferring due to Right knee pain/weakness in order to progress toward premorbid status.   Status at last note/certification: Limits stand/amb, reports some difficulty with transfers     PLAN  [x]  Upgrade activities as tolerated     []  Continue plan of care  []  Update interventions per flow sheet       []  Discharge due to:_  []  Other:_      Hoang Catherine PTA 4/3/2017  5:04 PM

## 2017-04-06 ENCOUNTER — HOSPITAL ENCOUNTER (OUTPATIENT)
Dept: PHYSICAL THERAPY | Age: 72
Discharge: HOME | End: 2017-04-06
Payer: MEDICARE

## 2017-04-06 PROCEDURE — 97110 THERAPEUTIC EXERCISES: CPT

## 2017-04-06 PROCEDURE — 97140 MANUAL THERAPY 1/> REGIONS: CPT

## 2017-04-06 NOTE — PROGRESS NOTES
PT DAILY TREATMENT NOTE - [facility] 316    Patient Name: Josie Madden  Date:2017  : 1945  [x]  Patient  Verified  Payor: [place] MEDICARE / Plan: [place] MEDICARE PART A & B / Product Type: Medicare /    In time:2:30  Out time:3;25  Total Treatment Time (min): 55  Total Timed Codes (min): 45  1:1 Treatment Time ( [address] only): 39   Visit #: 2 of 10    Treatment Area: Right knee pain [M25.561]    SUBJECTIVE  Pain Level (0-10 scale): 1  Any medication changes, allergies to medications, adverse drug reactions, diagnosis change, or new procedure performed?: [x] No    [] Yes (see summary sheet for update)  Subjective functional status/changes:   [] No changes reported  I did a lot of walking at the mall yesterday  That might be why I ave more soreness    OBJECTIVE    Modality rationale: decrease pain to improve the patients ability to improve activity tolerance   Min Type Additional Details    [] Estim:  []Unatt       []IFC  []Premod                        []Other:  []w/ice   []w/heat  Position:  Location:    [] Estim: []Att    []TENS instruct  []NMES                    []Other:  []w/US   []w/ice   []w/heat  Position:  Location:    []  Traction: [] Cervical       []Lumbar                       [] Prone          []Supine                       []Intermittent   []Continuous Lbs:  [] before manual  [] after manual    []  Ultrasound: []Continuous   [] Pulsed                           []1MHz   []3MHz Location:  W/cm2:    []  Iontophoresis with dexamethasone         Location: [] Take home patch   [] In clinic   10 [x]  Ice     []  heat  []  Ice massage  []  Laser   []  Anodyne Position: supine  Location: R knee    []  Laser with stim  []  Other: Position:  Location:    []  Vasopneumatic Device Pressure:       [] lo [] med [] hi   Temperature: [] lo [] med [] hi   [x] Skin assessment post-treatment:  [x]intact []redness- no adverse reaction    []redness - adverse reaction:       37 min Therapeutic Exercise:  [] See flow sheet : Rationale: increase ROM and increase strength to improve the patients ability to walk and negotiate stairs    8 min Manual Therapy:  STM to distal quad, HS   Rationale: increase ROM and increase tissue extensibility to improve ease of gait          With   [] TE   [] TA   [] neuro   [] other: Patient Education: [x] Review HEP    [] Progressed/Changed HEP based on:   [] positioning   [] body mechanics   [] transfers   [] heat/ice application    [] other:      Other Objective/Functional Measures:   Increased step ups to 20x     Pain Level (0-10 scale) post treatment:  0    ASSESSMENT/Changes in Function: Soreness with prolonged walking, but not limiting tolerance     Patient will continue to benefit from skilled PT services to modify and progress therapeutic interventions, address functional mobility deficits, address ROM deficits, address strength deficits, analyze and address soft tissue restrictions, analyze and cue movement patterns, analyze and modify body mechanics/ergonomics, assess and modify postural abnormalities, address imbalance/dizziness and instruct in home and community integration to attain remaining goals. []  See Plan of Care  []  See progress note/recertification  []  See Discharge Summary         Progress towards goals / Updated goals:  Goal: Patient will improve FOTO assessment score to 62 in order to indicate improved functional abilities. Status at last note/certification: 48  Goal: Patient will report no challenge with negotiating stairs with reciprocal step pattern with use of handrail in order to progress toward personal goals. Status at last note/certification: Step to  Continuing to negotiate stairs with step to pattern  Not met  Goal: Patient will report no limitations to standing/ambulating/transferring due to Right knee pain/weakness in order to progress toward premorbid status.   Status at last note/certification: Limits stand/amb, reports some difficulty with transfers     PLAN  [x]  Upgrade activities as tolerated     []  Continue plan of care  []  Update interventions per flow sheet       []  Discharge due to:_  []  Other:_      [place], CHRISSIE 4/6/2017  2:39 PM

## 2017-04-10 ENCOUNTER — APPOINTMENT (OUTPATIENT)
Dept: PHYSICAL THERAPY | Age: 72
End: 2017-04-10
Payer: MEDICARE

## 2017-04-13 ENCOUNTER — HOSPITAL ENCOUNTER (OUTPATIENT)
Dept: PHYSICAL THERAPY | Age: 72
End: 2017-04-13
Payer: MEDICARE

## 2017-04-17 ENCOUNTER — APPOINTMENT (OUTPATIENT)
Dept: PHYSICAL THERAPY | Age: 72
End: 2017-04-17
Payer: MEDICARE

## 2017-04-20 ENCOUNTER — APPOINTMENT (OUTPATIENT)
Dept: PHYSICAL THERAPY | Age: 72
End: 2017-04-20
Payer: MEDICARE

## 2017-04-24 ENCOUNTER — APPOINTMENT (OUTPATIENT)
Dept: PHYSICAL THERAPY | Age: 72
End: 2017-04-24
Payer: MEDICARE

## 2017-04-27 ENCOUNTER — APPOINTMENT (OUTPATIENT)
Dept: PHYSICAL THERAPY | Age: 72
End: 2017-04-27
Payer: MEDICARE

## 2017-07-17 ENCOUNTER — APPOINTMENT (OUTPATIENT)
Dept: PHYSICAL THERAPY | Age: 72
End: 2017-07-17

## 2017-07-25 ENCOUNTER — HOSPITAL ENCOUNTER (OUTPATIENT)
Dept: PHYSICAL THERAPY | Age: 72
Discharge: HOME | End: 2017-07-25
Payer: MEDICARE

## 2017-07-25 PROCEDURE — 97110 THERAPEUTIC EXERCISES: CPT

## 2017-07-25 PROCEDURE — 97162 PT EVAL MOD COMPLEX 30 MIN: CPT

## 2017-07-25 PROCEDURE — G8982 BODY POS GOAL STATUS: HCPCS

## 2017-07-25 PROCEDURE — G8981 BODY POS CURRENT STATUS: HCPCS

## 2017-07-25 NOTE — PROGRESS NOTES
[facility] - [facility], [address] [place] [address]  [phone] fax  Plan of Care/ Statement of Necessity for Physical Therapy Services    Patient name: Mio Alcazar Start of Care: 2017   Referral source: Anil Rosales MD : 1945    Medical Diagnosis: Right leg pain [M79.604]  Left leg pain [M79.605]   Onset Date:One month ago    Treatment Diagnosis: Right Knee Pain   Prior Hospitalization: see medical history Provider#: [id number]   Medications: Verified on Patient summary List    Comorbidities: Heart disease; Depression; Right quadriceps tendon rupture/surgery 2016   Prior Level of Function: Lives in [facility] with spouse; ; functionally independent      The Plan of Care and following information is based on the information from the initial evaluation. Assessment/ key information: Pt is a 67 y.o. male who presents with c/o insidious onset of R knee stiffness that limits R knee mobility and function in the past month. Pt has hx of R quadriceps tendon rupture and corrective surgery in 2016. Pt finished a course of PT in 2017 but noticed after painting kitchen - climbing up/down ladder - and increased amb, stair negotiation with out of town trips that discomfort has increased. Pt has difficulty performing yard work, negotiating the stairs in his home, and bending down to  objects. Upon exam, Pt exhibited palpable tautness to distal quad, articularis genu, and distal ITB; limited R knee AROM of 0-106 deg compared to 0-122 deg L; med/lat instability with double leg squatting and SLS. Pt would benefit from skilled PT to address above deficits to improve Pt's function and ability to return to prior level of function without stiffness or pain.     Evaluation Complexity History MEDIUM  Complexity : 1-2 comorbidities / personal factors will impact the outcome/ POC ; Examination MEDIUM Complexity : 3 Standardized tests and measures addressing body structure, function, activity limitation and / or participation in recreation  ;Presentation MEDIUM Complexity : Evolving with changing characteristics  ; Clinical Decision Making MEDIUM Complexity : FOTO score of 26-74  Overall Complexity Rating: MEDIUM  Problem List: pain affecting function, decrease ROM, decrease strength, impaired gait/ balance, decrease ADL/ functional abilitiies, decrease activity tolerance, decrease flexibility/ joint mobility and decrease transfer abilities   Treatment Plan may include any combination of the following: Therapeutic exercise, Therapeutic activities, Neuromuscular re-education, Physical agent/modality, Gait/balance training, Manual therapy and Patient education  Patient / Family readiness to learn indicated by: asking questions, trying to perform skills and interest  Persons(s) to be included in education: patient (P)  Barriers to Learning/Limitations: None  Patient Goal (s): Walking; stairs; strength.   Patient Self Reported Health Status: good  Rehabilitation Potential: good    Short Term Goals: To be accomplished in 1 weeks:  Goal: Pt to be compliant with initial HEP to improve R knee AROM. Status at last note/certification: Established    Long Term Goals: To be accomplished in 4 weeks:  Goal: Pt to increase R knee AROM of 0-122 deg to increase ease with negotiating stairs at home. Status at last note/certification: R knee 0-106 deg  Goal: Pt to perform 5 double leg squats without compensations or pain to bend and  objects. Status at last note/certification: instability and discomfort with squat  Goal: Pt to report 0/10 pain on average to perform yard work without difficulty. Status at last note/certification: 6/76 pain at worst  Goal: Pt to report FOTO score of 61 pts to be able to perform reciprocal pattern on stairs at home.   Status at last note/certification: FOTO 41 pts     Frequency / Duration: Patient to be seen 2 times per week for 4 weeks. Patient/ Caregiver education and instruction: Diagnosis, prognosis, exercises   [x]  Plan of care has been reviewed with CHRISSIE    G-Codes (GP)  Position   Current  CK= 40-59%   Goal  CJ= 20-39%    The severity rating is based on clinical judgment and the FOTO score. Certification Period: 7/25/17 - 8/23/17    Renita Wolfe, PT 7/25/2017 5:07 PM  _____________________________________________________________________  I certify that the above Therapy Services are being furnished while the patient is under my care. I agree with the treatment plan and certify that this therapy is necessary.     Physician's Signature:____________________  Date:__________Time:______    Please sign and return to [facility] - [address]  [phone] fax

## 2017-07-25 NOTE — PROGRESS NOTES
PT DAILY TREATMENT NOTE - [facility] 316    Patient Name: Flakita Paulino  Date:2017  : 1945  [x]  Patient  Verified  Payor: Wilian Plan / Plan: VA MEDICARE PART A & B / Product Type: Medicare /    In time:4:30  Out time:5:00  Total Treatment Time (min): 30  Total Timed Codes (min): 8  1:1 Treatment Time ( [address] only): 30   Visit #: 1 of 8    Treatment Area: Right leg pain [M79.604]  Left leg pain [M79.605]    SUBJECTIVE  Pain Level (0-10 scale): 1/10  Any medication changes, allergies to medications, adverse drug reactions, diagnosis change, or new procedure performed?: [x] No    [] Yes (see summary sheet for update)  Subjective functional status/changes:   [x] See paper initial evaluation form in patient chart      OBJECTIVE    22 min [x]Eval                  []Re-Eval     8 min Therapeutic Exercise:  [] See flow sheet : HEP given and reviewed with Pt   Rationale: increase ROM to improve the patients ability to reduce stiffness with R knee movement          With   [x] TE   [] TA   [] neuro   [] other: Patient Education: [x] Review HEP    [] Progressed/Changed HEP based on:   [] positioning   [] body mechanics   [] transfers   [x] heat/ice application    [] other:      Other Objective/Functional Measures: FOTO 41 pts     Pain Level (0-10 scale) post treatment: 1/10    ASSESSMENT/Changes in Function:     Patient will continue to benefit from skilled PT services to address functional mobility deficits, address ROM deficits, address strength deficits, analyze and address soft tissue restrictions, analyze and cue movement patterns, analyze and modify body mechanics/ergonomics and instruct in home and community integration to attain remaining goals.      [x]  See Plan of Care         PLAN  []  Upgrade activities as tolerated     [x]  Continue plan of care  []  Update interventions per flow sheet       []  Discharge due to:_  []  Other:_      Renita Wolfe, PT 2017  5:05 PM

## 2017-07-28 ENCOUNTER — HOSPITAL ENCOUNTER (OUTPATIENT)
Dept: PHYSICAL THERAPY | Age: 72
End: 2017-07-28
Payer: MEDICARE

## 2017-08-01 ENCOUNTER — HOSPITAL ENCOUNTER (OUTPATIENT)
Dept: PHYSICAL THERAPY | Age: 72
End: 2017-08-01
Payer: MEDICARE

## 2017-08-04 ENCOUNTER — HOSPITAL ENCOUNTER (OUTPATIENT)
Dept: PHYSICAL THERAPY | Age: 72
Discharge: HOME | End: 2017-08-04
Payer: MEDICARE

## 2017-08-04 PROCEDURE — 97112 NEUROMUSCULAR REEDUCATION: CPT

## 2017-08-04 PROCEDURE — 97035 APP MDLTY 1+ULTRASOUND EA 15: CPT

## 2017-08-04 NOTE — PROGRESS NOTES
PT DAILY TREATMENT NOTE - [facility]     Patient Name: Leoncio Duque  Date:2017  : 1945  [x]  Patient  Verified  Payor: Wilian Plan / Plan: [place] MEDICARE PART A & B / Product Type: Medicare /    In time:400  Out time:440  Total Treatment Time (min): 40  Total Timed Codes (min): 40  1:1 Treatment Time ( [address] only): 25  Visit #: 2 of 8    Treatment Area: Right leg pain [M79.604]  Left leg pain [M79.605]    SUBJECTIVE  Pain Level (0-10 scale): 1  Any medication changes, allergies to medications, adverse drug reactions, diagnosis change, or new procedure performed?: [x] No    [] Yes (see summary sheet for update)  Subjective functional status/changes:   [] No changes reported  It's alright.      OBJECTIVE    Modality rationale: decrease inflammation and decrease pain to improve the patients ability to improve activity tolerance   Min Type Additional Details    [] Estim:  []Unatt       []IFC  []Premod                        []Other:  []w/ice   []w/heat  Position:  Location:    [] Estim: []Att    []TENS instruct  []NMES                    []Other:  []w/US   []w/ice   []w/heat  Position:  Location:    []  Traction: [] Cervical       []Lumbar                       [] Prone          []Supine                       []Intermittent   []Continuous Lbs:  [] before manual  [] after manual   8 [x]  Ultrasound: []Continuous   [x] Pulsed                           []1MHz   [x]3MHz W/cm2:1.1  Location:R distal quad    []  Iontophoresis with dexamethasone         Location: [] Take home patch   [] In clinic    []  Ice     []  heat  []  Ice massage  []  Laser   []  Anodyne Position:  Location:    []  Laser with stim  []  Other:  Position:  Location:    []  Vasopneumatic Device Pressure:       [] lo [] med [] hi   Temperature: [] lo [] med [] hi   [] Skin assessment post-treatment:  []intact []redness- no adverse reaction    []redness - adverse reaction:     32 min Neuromuscular Re-education:  []  See flow sheet :   Rationale: increase strength, improve coordination, improve balance and increase proprioception  to improve the patients ability to improve ease of mobility       With   [] TE   [] TA   [] neuro   [] other: Patient Education: [x] Review HEP    [] Progressed/Changed HEP based on:   [] positioning   [] body mechanics   [] transfers   [] heat/ice application    [] other:      Other Objective/Functional Measures: initiated treatment per flow sheet     Pain Level (0-10 scale) post treatment: 1    ASSESSMENT/Changes in Function: Wife states that Patient has not been motivated to improve recently and has not been doing exercises. Couple is concerned about his treatment and whether or not it will be aggressive enough. Discussed with Patient that he will not notice an improvement unless he is compliant. Patient will continue to benefit from skilled PT services to modify and progress therapeutic interventions, address functional mobility deficits, address ROM deficits, address strength deficits, analyze and address soft tissue restrictions, analyze and cue movement patterns, analyze and modify body mechanics/ergonomics, assess and modify postural abnormalities, address imbalance/dizziness and instruct in home and community integration to attain remaining goals. []  See Plan of Care  []  See progress note/recertification  []  See Discharge Summary         Progress towards goals / Updated goals:  Short Term Goals: To be accomplished in 1 weeks:  Goal: Pt to be compliant with initial HEP to improve R knee AROM. Status at last note/certification: Established     Long Term Goals: To be accomplished in 4 weeks:  Goal: Pt to increase R knee AROM of 0-122 deg to increase ease with negotiating stairs at home. Status at last note/certification: R knee 0-106 deg  Goal: Pt to perform 5 double leg squats without compensations or pain to bend and  objects.   Status at last note/certification: instability and discomfort with squat  Goal: Pt to report 0/10 pain on average to perform yard work without difficulty. Status at last note/certification: 1/71 pain at worst  Goal: Pt to report FOTO score of 61 pts to be able to perform reciprocal pattern on stairs at home.   Status at last note/certification: FOTO 41 pts     PLAN  []  Upgrade activities as tolerated     [x]  Continue plan of care  []  Update interventions per flow sheet       []  Discharge due to:_  []  Other:_      Kane Fuller, PT 8/4/2017  1:34 PM    Future Appointments  Date Time Provider Avis Greco   8/4/2017 4:00 PM Kane Fuller, [facility]   8/8/2017 4:00 PM Renita Wolfe, [facility]   8/10/2017 4:00 PM Renita Wolfe, [facility]   8/15/2017 4:00 PM Renita Wolfe, [facility]   8/17/2017 4:00 PM Renita Wolfe, [facility]   8/22/2017 4:00 PM Renita Wolfe, [facility]   8/24/2017 4:00 PM Renita Wolfe, [facility]   8/29/2017 4:00 PM Renita Wolfe, [facility]   8/31/2017 2:00 PM Petra Villaseñor  [address] [place]   8/31/2017 4:00 PM Renita Wolfe, [facility]

## 2017-08-08 ENCOUNTER — OFFICE VISIT (OUTPATIENT)
Dept: INTERNAL MEDICINE CLINIC | Age: 72
End: 2017-08-08

## 2017-08-08 ENCOUNTER — APPOINTMENT (OUTPATIENT)
Dept: PHYSICAL THERAPY | Age: 72
End: 2017-08-08
Payer: MEDICARE

## 2017-08-08 VITALS
OXYGEN SATURATION: 96 % | HEIGHT: 69 IN | RESPIRATION RATE: 16 BRPM | SYSTOLIC BLOOD PRESSURE: 146 MMHG | TEMPERATURE: 97.6 F | WEIGHT: 155 LBS | HEART RATE: 76 BPM | DIASTOLIC BLOOD PRESSURE: 82 MMHG | BODY MASS INDEX: 22.96 KG/M2

## 2017-08-08 DIAGNOSIS — M25.561 ACUTE PAIN OF RIGHT KNEE: ICD-10-CM

## 2017-08-08 DIAGNOSIS — F32.1 MODERATE SINGLE CURRENT EPISODE OF MAJOR DEPRESSIVE DISORDER (HCC): Primary | ICD-10-CM

## 2017-08-08 RX ORDER — SERTRALINE HYDROCHLORIDE 25 MG/1
TABLET, FILM COATED ORAL DAILY
COMMUNITY
End: 2017-11-03 | Stop reason: SDUPTHER

## 2017-08-08 RX ORDER — GLUCOSAMINE HCL 500 MG
2000 TABLET ORAL
COMMUNITY
End: 2018-08-20 | Stop reason: ALTCHOICE

## 2017-08-08 RX ORDER — BISMUTH SUBSALICYLATE 262 MG
1 TABLET,CHEWABLE ORAL DAILY
COMMUNITY
End: 2018-08-20 | Stop reason: ALTCHOICE

## 2017-08-08 RX ORDER — CELECOXIB 200 MG/1
CAPSULE ORAL
Qty: 60 CAP | Refills: 2 | Status: SHIPPED | OUTPATIENT
Start: 2017-08-08 | End: 2017-08-29 | Stop reason: ALTCHOICE

## 2017-08-08 NOTE — PROGRESS NOTES
1. Have you been to the ER, urgent care clinic since your last visit? Hospitalized since your last visit? No    2. Have you seen or consulted any other health care providers outside of the [address] [place] [address] since your last visit? Include any pap smears or colon screening.  Dr. Justice Collazo

## 2017-08-08 NOTE — PROGRESS NOTES
The patient presents to the office today with the chief complaint of depression    HPI    The patient had been doing well but he fell 2 1/2 months ago and tore the the quadricepts tendons to his left leg. He had surgical correction and begin physical therapy. The patient had pain and stiffness with PT. He became progressively depressed and then lost the drive for PT. He was became anxious. He had decreased appetite and some problems with sleep. He would just sit on the couch. At times he has thought of self harm but he is a [place] and he \"could never do that. \"   The patient has begun Zoloft which is being well tolerated. There may have been some improvement in the 5 days that the patient has been on medication. Review of Systems   Respiratory: Negative for shortness of breath. Cardiovascular: Negative for chest pain and leg swelling. Musculoskeletal: Positive for joint pain. Psychiatric/Behavioral: Positive for depression. Allergies   Allergen Reactions    Lisinopril Other (comments)     Burning    Codeine Other (comments)     Pt reports manic reactions.  Droperidol Nausea Only       Current Outpatient Prescriptions   Medication Sig Dispense Refill    ASPIRIN (ASPIR-81 PO) Take  by mouth.  sertraline (ZOLOFT) 25 mg tablet Take  by mouth daily.  multivitamin (ONE A DAY) tablet Take 1 Tab by mouth daily.  Cholecalciferol, Vitamin D3, 3,000 unit tab Take 2,000 Units by mouth.  celecoxib (CELEBREX) 200 mg capsule 1 capsule two per day with food 60 Cap 2       No past medical history on file. Past Surgical History:   Procedure Laterality Date    CARDIAC SURG PROCEDURE UNLIST      Quad bipass surgery 2014       Social History     Social History    Marital status: UNKNOWN     Spouse name: N/A    Number of children: N/A    Years of education: N/A     Occupational History    Not on file.      Social History Main Topics    Smoking status: Never Smoker    Smokeless tobacco: Never Used    Alcohol use Yes      Comment: Occationaly    Drug use: No    Sexual activity: Not on file     Other Topics Concern    Not on file     Social History Narrative    No narrative on file       Patient does not have an advanced directive on file    Visit Vitals    /82 (BP 1 Location: Left arm, BP Patient Position: Sitting)    Pulse 76    Temp 97.6 °F (36.4 °C) (Tympanic)    Resp 16    Ht 5' 9\" (1.753 m)    Wt 155 lb (70.3 kg)    SpO2 96%    BMI 22.89 kg/m2       Physical Exam   No Cervical Lymphadenopathy  No Supraclavicular Lymphadenopathy  Thyroid is Normal  Lungs are clear to ausculation and percussion  Heart:  S1 S2 are normal, No gallops, No mummers  No Carotid Bruits  Abdomen:  Normal Bowel Sounds. No tenderness. No masses. No Hepatomegaly or Splenomegly  LE:  Strong Pedal Pulses. No Edema  Right knee:  No effusion. Pain with flexion and extension. Fair range of motion. Left knee:  Scar over the right knee. Mild Osteoarthritis    BMI:  OK    No results found for any previous visit. .No results found for any visits on 08/08/17. Assessment / Plan      ICD-10-CM ICD-9-CM    1. Moderate single current episode of major depressive disorder (HCC) F32.1 296.22    2. Acute pain of right knee M25.561 719.46      he was advised to continue his maintenance medications  Follow symptoms. To ER if worse    Follow-up Disposition:  Return in about 1 week (around 8/15/2017). I asked Leoncio Duque if he has any questions and I answered the questions.   Leoncio Duque states that he understands the treatment plan and agrees with the treatment plan

## 2017-08-09 ENCOUNTER — HOSPITAL ENCOUNTER (OUTPATIENT)
Dept: PHYSICAL THERAPY | Age: 72
End: 2017-08-09
Payer: MEDICARE

## 2017-08-10 ENCOUNTER — APPOINTMENT (OUTPATIENT)
Dept: PHYSICAL THERAPY | Age: 72
End: 2017-08-10
Payer: MEDICARE

## 2017-08-11 ENCOUNTER — HOSPITAL ENCOUNTER (OUTPATIENT)
Dept: PHYSICAL THERAPY | Age: 72
Discharge: HOME | End: 2017-08-11
Payer: MEDICARE

## 2017-08-11 PROCEDURE — 97035 APP MDLTY 1+ULTRASOUND EA 15: CPT

## 2017-08-11 PROCEDURE — 97112 NEUROMUSCULAR REEDUCATION: CPT

## 2017-08-11 NOTE — PROGRESS NOTES
PT DAILY TREATMENT NOTE - [facility]     Patient Name: David Eason  Date:2017  : 1945  [x]  Patient  Verified  Payor: Alessandro Napier / Plan: [place] MED[place] & B / Product Type: Medicare /    In time:1030  Out time:1100  Total Treatment Time (min): 30  Total Timed Codes (min): 30  1:1 Treatment Time ( [address] only): 30   Visit #: 3 of 8    Treatment Area: Right leg pain [M79.604]  Left leg pain [M79.605]    SUBJECTIVE  Pain Level (0-10 scale): 1  Any medication changes, allergies to medications, adverse drug reactions, diagnosis change, or new procedure performed?: [x] No    [] Yes (see summary sheet for update)  Subjective functional status/changes:   [] No changes reported  My wife is working me too hard.      OBJECTIVE    Modality rationale: decrease inflammation and decrease pain to improve the patients ability to improve activity tolerance   Min Type Additional Details    [] Estim:  []Unatt       []IFC  []Premod                        []Other:  []w/ice   []w/heat  Position:  Location:    [] Estim: []Att    []TENS instruct  []NMES                    []Other:  []w/US   []w/ice   []w/heat  Position:  Location:    []  Traction: [] Cervical       []Lumbar                       [] Prone          []Supine                       []Intermittent   []Continuous Lbs:  [] before manual  [] after manual   8 [x]  Ultrasound: []Continuous   [x] Pulsed                           []1MHz   [x]3MHz W/cm2:1.1  Location:R distal quad    []  Iontophoresis with dexamethasone         Location: [] Take home patch   [] In clinic    []  Ice     []  heat  []  Ice massage  []  Laser   []  Anodyne Position:  Location:    []  Laser with stim  []  Other:  Position:  Location:    []  Vasopneumatic Device Pressure:       [] lo [] med [] hi   Temperature: [] lo [] med [] hi   [] Skin assessment post-treatment:  []intact []redness- no adverse reaction    []redness - adverse reaction:     22 min Neuromuscular Re-education:  [x]  See flow sheet :   Rationale: increase strength, improve coordination, improve balance and increase proprioception  to improve the patients ability to improve knee stability and ease of recreational activities     With   [] TE   [] TA   [] neuro   [] other: Patient Education: [x] Review HEP    [] Progressed/Changed HEP based on:   [] positioning   [] body mechanics   [] transfers   [] heat/ice application    [] other:      Other Objective/Functional Measures: held some exercises     Pain Level (0-10 scale) post treatment: 1    ASSESSMENT/Changes in Function: Focused on gentle range of motion and modalities today in light of increased soreness. Patient will continue to benefit from skilled PT services to modify and progress therapeutic interventions, address functional mobility deficits, address ROM deficits, address strength deficits, analyze and address soft tissue restrictions, analyze and cue movement patterns, analyze and modify body mechanics/ergonomics, assess and modify postural abnormalities, address imbalance/dizziness and instruct in home and community integration to attain remaining goals. []  See Plan of Care  []  See progress note/recertification  []  See Discharge Summary         Progress towards goals / Updated goals:  Short Term Goals: To be accomplished in 1 weeks:  Goal: Pt to be compliant with initial HEP to improve R knee AROM. Status at last note/certification: Established  Current status: Progressing, some compliance    Long Term Goals: To be accomplished in 4 weeks:  Goal: Pt to increase R knee AROM of 0-122 deg to increase ease with negotiating stairs at home. Status at last note/certification: R knee 0-106 deg  Current status: Not met, 0-105    Goal: Pt to perform 5 double leg squats without compensations or pain to bend and  objects.   Status at last note/certification: instability and discomfort with squat  Current status: Progressing, discomfort/stiffness    Goal: Pt to report 0/10 pain on average to perform yard work without difficulty. Status at last note/certification: 4/91 pain at worst  Current status: Progressing, 1/10    Goal: Pt to report FOTO score of 61 pts to be able to perform reciprocal pattern on stairs at home.   Status at last note/certification: FOTO 41 pts   Current status: Assess next visit    PLAN  [x]  Upgrade activities as tolerated     [x]  Continue plan of care  []  Update interventions per flow sheet       []  Discharge due to:_  []  Other:_      Albert Hester, PT 8/11/2017  7:11 AM    Future Appointments  Date Time Provider Avis Greco   8/11/2017 10:30 AM Albert Hester, PT [facility]   8/14/2017 4:00  [address] [facility]   8/18/2017 10:00 AM Albert Hester, PT [facility]   8/23/2017 9:30 AM Albert Hester, PT [facility]   8/25/2017 12:00 PM Albert Hester, PT [facility]   8/28/2017 12:00 PM [facility]   8/30/2017 10:00 AM Albert Hester, PT [facility]

## 2017-08-14 ENCOUNTER — HOSPITAL ENCOUNTER (OUTPATIENT)
Dept: PHYSICAL THERAPY | Age: 72
Discharge: HOME | End: 2017-08-14
Payer: MEDICARE

## 2017-08-14 PROCEDURE — 97112 NEUROMUSCULAR REEDUCATION: CPT

## 2017-08-14 NOTE — PROGRESS NOTES
PT DAILY TREATMENT NOTE - [facility] 3-16    Patient Name: Mansoor Ocampo  Date:2017  : 1945  [x]  Patient  Verified  Payor: Torsten Pore / Plan: [place] MEDICARE PART A & B / Product Type: Medicare /    In time:4;00  Out time:4;55  Total Treatment Time (min): 55  Total Timed Codes (min): 55  1:1 Treatment Time ( [address] only): 15   Visit #: 4 of 8    Treatment Area: Right leg pain [M79.604]  Left leg pain [M79.605]    SUBJECTIVE  Pain Level (0-10 scale): 1  Any medication changes, allergies to medications, adverse drug reactions, diagnosis change, or new procedure performed?: [x] No    [] Yes (see summary sheet for update)  Subjective functional status/changes:   [] No changes reported  I did some marching in the pool yesterday and I started having pain along the inner thigh    OBJECTIVE    Modality rationale: decrease pain and increase tissue extensibility to improve the patients ability to improve ease of function   Min Type Additional Details    [] Estim:  []Unatt       []IFC  []Premod                        []Other:  []w/ice   []w/heat  Position:  Location:    [] Estim: []Att    []TENS instruct  []NMES                    []Other:  []w/US   []w/ice   []w/heat  Position:  Location:    []  Traction: [] Cervical       []Lumbar                       [] Prone          []Supine                       []Intermittent   []Continuous Lbs:  [] before manual  [] after manual   8 [x]  Ultrasound: []Continuous   [x] Pulsed                           []1MHz   [x]3MHz  8 minutes Location:  Distal quad  W/cm2:  1.2    []  Iontophoresis with dexamethasone         Location: [] Take home patch   [] In clinic    []  Ice     []  heat  []  Ice massage  []  Laser   []  Anodyne Position:  Location:    []  Laser with stim  []  Other: Position:  Location:    []  Vasopneumatic Device Pressure:       [] lo [] med [] hi   Temperature: [] lo [] med [] hi   [x] Skin assessment post-treatment:  [x]intact []redness- no adverse reaction    []redness - adverse reaction:        47 min Neuromuscular Re-education:  []  See flow sheet :   Rationale: improve coordination and improve balance  to improve the patients ability to resume PLOF and recreational activities        With   [] TE   [] TA   [] neuro   [] other: Patient Education: [x] Review HEP    [] Progressed/Changed HEP based on:   [] positioning   [] body mechanics   [] transfers   [] heat/ice application    [] other:      Other Objective/Functional Measures:      Pain Level (0-10 scale) post treatment:  1    ASSESSMENT/Changes in Function:   Reported tightness in distal quad with LAQ, and discomfort in HS during stool pull. Palpable tightenss in quad and adductors    Patient will continue to benefit from skilled PT services to modify and progress therapeutic interventions, address functional mobility deficits, address ROM deficits, address strength deficits, analyze and address soft tissue restrictions, analyze and cue movement patterns, analyze and modify body mechanics/ergonomics, assess and modify postural abnormalities, address imbalance/dizziness and instruct in home and community integration to attain remaining goals. []  See Plan of Care  []  See progress note/recertification  []  See Discharge Summary         Progress towards goals / Updated goals:  Goal: Pt to be compliant with initial HEP to improve R knee AROM. Status at last note/certification: Established  Current status: Progressing, some compliance    Long Term Goals: To be accomplished in 4 weeks:  Goal: Pt to increase R knee AROM of 0-122 deg to increase ease with negotiating stairs at home. Status at last note/certification: R knee 0-106 deg  Current status: Not met, 0-105    Goal: Pt to perform 5 double leg squats without compensations or pain to bend and  objects.   Status at last note/certification: instability and discomfort with squat  Current status: Progressing, discomfort/stiffness    Goal: Pt to report 0/10 pain on average to perform yard work without difficulty. Status at last note/certification: 4/65 pain at worst  Current status: Progressing, 1/10    Goal: Pt to report FOTO score of 61 pts to be able to perform reciprocal pattern on stairs at home.   Status at last note/certification: FOTO 41 pts   Current status: FOTO 47  progressing    PLAN  [x]  Upgrade activities as tolerated     []  Continue plan of care  []  Update interventions per flow sheet       []  Discharge due to:_  []  Other:_      Rosalba Pradhan, CHRISSIE 8/14/2017  4:21 PM

## 2017-08-15 ENCOUNTER — APPOINTMENT (OUTPATIENT)
Dept: PHYSICAL THERAPY | Age: 72
End: 2017-08-15
Payer: MEDICARE

## 2017-08-17 ENCOUNTER — APPOINTMENT (OUTPATIENT)
Dept: PHYSICAL THERAPY | Age: 72
End: 2017-08-17
Payer: MEDICARE

## 2017-08-18 ENCOUNTER — HOSPITAL ENCOUNTER (OUTPATIENT)
Dept: PHYSICAL THERAPY | Age: 72
Discharge: HOME | End: 2017-08-18
Payer: MEDICARE

## 2017-08-18 PROCEDURE — 97112 NEUROMUSCULAR REEDUCATION: CPT

## 2017-08-18 PROCEDURE — 97035 APP MDLTY 1+ULTRASOUND EA 15: CPT

## 2017-08-18 NOTE — PROGRESS NOTES
PT DAILY TREATMENT NOTE - [facility]     Patient Name: Steffanie Mascorro  Date:2017  : 1945  [x]  Patient  Verified  Payor: Pazakiya Pederson / Plan: [place] MEDICARE PART A & B / Product Type: Medicare /    In time:1000  Out time:1053  Total Treatment Time (min): 53  Total Timed Codes (min): 43  1:1 Treatment Time ( [address] only): 36   Visit #: 5 of 8    Treatment Area: Right leg pain [M79.604]  Left leg pain [M79.605]    SUBJECTIVE  Pain Level (0-10 scale): 1  Any medication changes, allergies to medications, adverse drug reactions, diagnosis change, or new procedure performed?: [x] No    [] Yes (see summary sheet for update)  Subjective functional status/changes:   [] No changes reported  I think I overdid it. Yesterday we moved my son in, he's on the third floor. My wife told me to walk up all those stairs.      OBJECTIVE    Modality rationale: decrease inflammation and decrease pain to improve the patients ability to improve activity tolerance   Min Type Additional Details    [] Estim:  []Unatt       []IFC  []Premod                        []Other:  []w/ice   []w/heat  Position:  Location:    [] Estim: []Att    []TENS instruct  []NMES                    []Other:  []w/US   []w/ice   []w/heat  Position:  Location:    []  Traction: [] Cervical       []Lumbar                       [] Prone          []Supine                       []Intermittent   []Continuous Lbs:  [] before manual  [] after manual   8 [x]  Ultrasound: []Continuous   [x] Pulsed                           []1MHz   [x]3MHz W/cm2:1.1  Location:R distal quads    []  Iontophoresis with dexamethasone         Location: [] Take home patch   [] In clinic   10 [x]  Ice     []  heat  []  Ice massage  []  Laser   []  Anodyne Position:reclined  Location:R knee    []  Laser with stim  []  Other:  Position:  Location:    []  Vasopneumatic Device Pressure:       [] lo [] med [] hi   Temperature: [] lo [] med [] hi   [] Skin assessment post-treatment:  []intact []redness- no adverse reaction    []redness - adverse reaction:     35 min Neuromuscular Re-education:  [x]  See flow sheet :   Rationale: increase strength, improve coordination, improve balance and increase proprioception  to improve the patients ability to improve ease of mobility, knee stability     With   [] TE   [] TA   [] neuro   [] other: Patient Education: [x] Review HEP    [] Progressed/Changed HEP based on:   [] positioning   [] body mechanics   [] transfers   [] heat/ice application    [] other:      Other Objective/Functional Measures: held step ups due to several stairs yesterday     Pain Level (0-10 scale) post treatment: 1    ASSESSMENT/Changes in Function: Patient reports soreness today, however pain rating does not reflect any increase. Demonstrates some improved ROM with flexion. Patient will continue to benefit from skilled PT services to modify and progress therapeutic interventions, address functional mobility deficits, address ROM deficits, address strength deficits, analyze and address soft tissue restrictions, analyze and cue movement patterns, analyze and modify body mechanics/ergonomics, assess and modify postural abnormalities, address imbalance/dizziness and instruct in home and community integration to attain remaining goals. []  See Plan of Care  []  See progress note/recertification  []  See Discharge Summary         Progress towards goals / Updated goals:  Goal: Pt to be compliant with initial HEP to improve R knee AROM. Status at last note/certification: Established  Current status: Met, wife keeping him compliant  Long Term Goals: To be accomplished in 4 weeks:  Goal: Pt to increase R knee AROM of 0-122 deg to increase ease with negotiating stairs at home. Status at last note/certification: R knee 0-106 deg  Current status: Progressing, 0-112  Goal: Pt to perform 5 double leg squats without compensations or pain to bend and  objects.   Status at last note/certification: instability and discomfort with squat  Current status: Met    Goal: Pt to report 0/10 pain on average to perform yard work without difficulty. Status at last note/certification: 3/34 pain at worst  Current status: Progressing, consistently 1/10 pain at therapy  Goal: Pt to report FOTO score of 61 pts to be able to perform reciprocal pattern on stairs at home.   Status at last note/certification: FOTO 41 pts   Current status: FOTO 47  progressing    PLAN  [x]  Upgrade activities as tolerated     [x]  Continue plan of care  []  Update interventions per flow sheet       []  Discharge due to:_  []  Other:_      Carmel Little, PT 8/18/2017  7:47 AM    Future Appointments  Date Time Provider Avis Greco   8/18/2017 10:00 AM Carmel Little, [facility]   8/23/2017 9:30  [address] [facility]   8/25/2017 12:00 PM Carmel Little, [facility]   8/28/2017 12:00 PM Sabra Castellon 1944 [facility]   8/30/2017 10:00 AM Carmel Little, [facility] [place]

## 2017-08-21 ENCOUNTER — HOSPITAL ENCOUNTER (OUTPATIENT)
Dept: PHYSICAL THERAPY | Age: 72
Discharge: HOME | End: 2017-08-21
Payer: MEDICARE

## 2017-08-21 PROCEDURE — G8982 BODY POS GOAL STATUS: HCPCS

## 2017-08-21 PROCEDURE — G8981 BODY POS CURRENT STATUS: HCPCS

## 2017-08-21 PROCEDURE — 97035 APP MDLTY 1+ULTRASOUND EA 15: CPT

## 2017-08-21 PROCEDURE — 97112 NEUROMUSCULAR REEDUCATION: CPT

## 2017-08-21 NOTE — PROGRESS NOTES
PT DAILY TREATMENT NOTE - [facility]    Patient Name: Kevyn Ahn  Date:2017  : 1945  [x]  Patient  Verified  Payor: [place] MEDICARE / Plan: [place] MEDICARE PART A & B / Product Type: Medicare /    In time:4;00  Out time:4;55  Total Treatment Time (min): 55  Total Timed Codes (min): 45  1:1 Treatment Time ( [address] only): 40   Visit #: 6 of 8    Treatment Area: Right leg pain [M79.604]  Left leg pain [M79.605]    SUBJECTIVE  Pain Level (0-10 scale): 1  Any medication changes, allergies to medications, adverse drug reactions, diagnosis change, or new procedure performed?: [x] No    [] Yes (see summary sheet for update)  Subjective functional status/changes:   [] No changes reported  Per wife, pt needs to work in stairs reciprocally.     OBJECTIVE    Modality rationale: decrease pain and increase tissue extensibility to improve the patients ability to negotiate stairs   Min Type Additional Details    [] Estim:  []Unatt       []IFC  []Premod                        []Other:  []w/ice   []w/heat  Position:  Location:    [] Estim: []Att    []TENS instruct  []NMES                    []Other:  []w/US   []w/ice   []w/heat  Position:  Location:    []  Traction: [] Cervical       []Lumbar                       [] Prone          []Supine                       []Intermittent   []Continuous Lbs:  [] before manual  [] after manual   8 [x]  Ultrasound: []Continuous   [x] Pulsed                           []1MHz   [x]3MHz  8 minutes Location: distal ITB and quad insertion  W/cm2:  1.2    []  Iontophoresis with dexamethasone         Location: [] Take home patch   [] In clinic   10 [x]  Ice     []  heat  []  Ice massage  []  Laser   []  Anodyne Position:  supine  Location: R knee    []  Laser with stim  []  Other: Position:  Location:    []  Vasopneumatic Device Pressure:       [] lo [] med [] hi   Temperature: [] lo [] med [] hi   [x] Skin assessment post-treatment:  [x]intact []redness- no adverse reaction    []redness - adverse reaction:         32 min Neuromuscular Re-education:  []  See flow sheet :   Rationale: increase ROM, increase strength and improve coordination  to improve the patients ability to negotiate stairs reciprocally, walking on uneven terrain. With   [] TE   [] TA   [] neuro   [] other: Patient Education: [x] Review HEP    [] Progressed/Changed HEP based on:   [] positioning   [] body mechanics   [] transfers   [] heat/ice application    [] other:      Other Objective/Functional Measures: Added stairs for emphasis on reciprocal gait pattern. % improved:  30%  Gains: overall everything has improved. No c/o pain. Stiffness and discomfort only. Deficits: still challenged with stairs, step to pattern. Pain Level (0-10 scale) post treatment: 1    ASSESSMENT/Changes in Function: pt able to ascend stairs reciprocally, however, decreased confidence and stability with descent due to limited eccentric quad strength    Patient will continue to benefit from skilled PT services to modify and progress therapeutic interventions, address functional mobility deficits, address ROM deficits, address strength deficits, analyze and address soft tissue restrictions, analyze and cue movement patterns, analyze and modify body mechanics/ergonomics, assess and modify postural abnormalities, address imbalance/dizziness and instruct in home and community integration to attain remaining goals. []  See Plan of Care  []  See progress note/recertification  []  See Discharge Summary         Progress towards goals / Updated goals:  Goal: Pt to be compliant with initial HEP to improve R knee AROM. Status at last note/certification: Established  Current status: Met, wife keeping him compliant  Long Term Goals: To be accomplished in 4 weeks:  Goal: Pt to increase R knee AROM of 0-122 deg to increase ease with negotiating stairs at home.   Status at last note/certification: R knee 0-106 deg  Current status: Progressing, 0-115  Goal: Pt to perform 5 double leg squats without compensations or pain to bend and  objects. Status at last note/certification: instability and discomfort with squat  Current status: Met    Goal: Pt to report 0/10 pain on average to perform yard work without difficulty. Status at last note/certification: 3/80 pain at worst  Current status: Progressing, consistently 1/10 pain at therapy  Goal: Pt to report FOTO score of 61 pts to be able to perform reciprocal pattern on stairs at home.   Status at last note/certification: FOTO 41 pts   Current status: FOTO 52  progressing    PLAN  [x]  Upgrade activities as tolerated     []  Continue plan of care  []  Update interventions per flow sheet       []  Discharge due to:_  []  Other:_      Epifanio Archibald, PTA 8/21/2017  4:01 PM

## 2017-08-22 ENCOUNTER — APPOINTMENT (OUTPATIENT)
Dept: PHYSICAL THERAPY | Age: 72
End: 2017-08-22
Payer: MEDICARE

## 2017-08-22 NOTE — PROGRESS NOTES
[facility] - [facility], [address] [place] [address]  [phone] fax    Continued Plan of Care/ Re-certification for Physical Therapy Services      Patient name: Edita Guy Start of Care: 2017   Referral source: Shaq Chatterjee MD : 1945                         Medical Diagnosis: Right leg pain [M79.604]  Left leg pain [M79.605] Onset Date:One month ago                         Treatment Diagnosis: Right Knee Pain   Prior Hospitalization: see medical history Provider#: [id number]   Medications: Verified on Patient summary List    Comorbidities: Heart disease; Depression; Right quadriceps tendon rupture/surgery 2016   Prior Level of Function: Lives in [facility] with spouse; ; functionally independent    Visits from Start of Care: 6    Missed Visits: 2    The Plan of Care and following information is based on the patient's current status:  Goal: Pt to be compliant with initial HEP to improve R knee AROM. Status at last note/certification: Established  Current status: Met, wife keeping him compliant  Long Term Goals: To be accomplished in 4 weeks:  Goal: Pt to increase R knee AROM of 0-122 deg to increase ease with negotiating stairs at home. Status at last note/certification: R knee 0-106 deg  Current status: Progressing, 0-115  Goal: Pt to perform 5 double leg squats without compensations or pain to bend and  objects. Status at last note/certification: instability and discomfort with squat  Current status: Met    Goal: Pt to report 0/10 pain on average to perform yard work without difficulty. Status at last note/certification:  pain at worst  Current status: Progressing, consistently 1/10 pain at therapy  Goal: Pt to report FOTO score of 61 pts to be able to perform reciprocal pattern on stairs at home.   Status at last note/certification: FOTO 41 pts   Current status: FOTO 47  progressing    Key functional changes:   % improved:  30%  Gains: overall everything has improved. No c/o pain. Stiffness and discomfort only. Deficits: still challenged with stairs, step to pattern. Problems/ barriers to goal attainment: none     Problem List: pain affecting function, decrease ROM, decrease strength, edema affecting function, impaired gait/ balance, decrease ADL/ functional abilitiies, decrease activity tolerance, decrease flexibility/ joint mobility and decrease transfer abilities    Treatment Plan: Therapeutic exercise, Therapeutic activities, Neuromuscular re-education, Physical agent/modality, Gait/balance training, Manual therapy, Aquatic therapy, Patient education, Self Care training, Functional mobility training, Home safety training and Stair training     Patient Goal (s) has been updated and includes: \"Reciprocal stairs\"     Goals for this certification period to be accomplished in 10 treatments:  Goal: Pt to increase R knee AROM of 0-122 deg to increase ease with negotiating stairs at home. Status at last note/certification: R knee 0-115  Goal: Pt to report 0/10 pain on average to perform yard work without difficulty. Status at last note/certification: 9/49  Goal: Pt to report FOTO score of 61 pts to be able to perform reciprocal pattern on stairs at home. Status at last note/certification: 47  Goal: Pt to report ability to negotiate stairs with reciprocal step pattern with ascending and descending pattern consistently in order to improve ease of mobility in the home. Status at last note/certification: Frequently step to    Frequency / Duration: Patient to be seen 2 times per week for 10 treatments:    Assessment / Recommendations:Patient demonstrates improved knee ROM at this time, but his biggest complaint is continued knee soreness affecting activity tolerance. Frequently reports feeling stiff and sore after repeated stair negotiation. Recommend continued therapy in order to further progress knee ROM and stability. G-Codes (GP)  Position  [id number] Current  CK= 40-59%  [id number] Goal  CJ= 20-39%    The severity rating is based on clinical judgment and the FOTO score. Certification Period: 8/22/17 to 9/20/17    Evangelina Napoles, PT 8/22/2017 11:50 AM    ________________________________________________________________________  I certify that the above Therapy Services are being furnished while the patient is under my care. I agree with the treatment plan and certify that this therapy is necessary. [] I have read the above and request that my patient continue as recommended.   [] I have read the above report and request that my patient continue therapy with the following changes/special instructions: ______________________________________  [] I have read the above report and request that my patient be discharged from therapy    Physician's Signature:_______________________________Date:___________Time:__________  Please sign and return to [facility] - [address]  [phone] fax

## 2017-08-23 ENCOUNTER — APPOINTMENT (OUTPATIENT)
Dept: PHYSICAL THERAPY | Age: 72
End: 2017-08-23
Payer: MEDICARE

## 2017-08-24 ENCOUNTER — APPOINTMENT (OUTPATIENT)
Dept: PHYSICAL THERAPY | Age: 72
End: 2017-08-24
Payer: MEDICARE

## 2017-08-25 ENCOUNTER — HOSPITAL ENCOUNTER (OUTPATIENT)
Dept: PHYSICAL THERAPY | Age: 72
End: 2017-08-25
Payer: MEDICARE

## 2017-08-28 ENCOUNTER — HOSPITAL ENCOUNTER (OUTPATIENT)
Dept: PHYSICAL THERAPY | Age: 72
Discharge: HOME | End: 2017-08-28
Payer: MEDICARE

## 2017-08-28 PROCEDURE — 97112 NEUROMUSCULAR REEDUCATION: CPT

## 2017-08-28 PROCEDURE — 97035 APP MDLTY 1+ULTRASOUND EA 15: CPT

## 2017-08-28 NOTE — PROGRESS NOTES
PT DAILY TREATMENT NOTE - [facility]     Patient Name: Shyla Mackay  Date:2017  : 1945  [x]  Patient  Verified  Payor: Sharona Simpson / Plan: [place] & B / Product Type: Medicare /    In time:1200  Out time:1241  Total Treatment Time (min): 41  Total Timed Codes (min): 31  1:1 Treatment Time ( [address] only): 31   Visit #: 1 of 10    Treatment Area: Right leg pain [M79.604]  Left leg pain [M79.605]    SUBJECTIVE  Pain Level (0-10 scale): 1-2  Any medication changes, allergies to medications, adverse drug reactions, diagnosis change, or new procedure performed?: [x] No    [] Yes (see summary sheet for update)  Subjective functional status/changes:   [] No changes reported  I have some bad news. I have to have another surgery.      OBJECTIVE    Modality rationale: decrease inflammation, decrease pain and increase tissue extensibility to improve the patients ability to improve activity tolerance   Min Type Additional Details    [] Estim:  []Unatt       []IFC  []Premod                        []Other:  []w/ice   []w/heat  Position:  Location:    [] Estim: []Att    []TENS instruct  []NMES                    []Other:  []w/US   []w/ice   []w/heat  Position:  Location:    []  Traction: [] Cervical       []Lumbar                       [] Prone          []Supine                       []Intermittent   []Continuous Lbs:  [] before manual  [] after manual   8 [x]  Ultrasound: []Continuous   [x] Pulsed                           []1MHz   [x]3MHz W/cm2:1.1  Location:R distal lateral quads    []  Iontophoresis with dexamethasone         Location: [] Take home patch   [] In clinic   10 []  Ice     [x]  heat  []  Ice massage  []  Laser   []  Anodyne Position:supine  Location:R thigh/knee    []  Laser with stim  []  Other:  Position:  Location:    []  Vasopneumatic Device Pressure:       [] lo [] med [] hi   Temperature: [] lo [] med [] hi   [] Skin assessment post-treatment:  []intact []redness- no adverse reaction []redness - adverse reaction:     23 min Neuromuscular Re-education:  [x]  See flow sheet :   Rationale: increase strength, improve coordination, improve balance and increase proprioception  to improve the patients ability to improve knee stability      With   [] TE   [] TA   [] neuro   [] other: Patient Education: [x] Review HEP    [] Progressed/Changed HEP based on:   [] positioning   [] body mechanics   [] transfers   [] heat/ice application    [] other:      Other Objective/Functional Measures: added Chair activities      Pain Level (0-10 scale) post treatment: 1    ASSESSMENT/Changes in Function: Patient states that MD follow up lead to talk of another surgery to the Right knee to remove/debride scar tissue. In that event we will reassess after surgery to adjust plan of care as needed. Today focused on activities to improve stair negotiation. Patient will continue to benefit from skilled PT services to modify and progress therapeutic interventions, address functional mobility deficits, address ROM deficits, address strength deficits, analyze and address soft tissue restrictions, analyze and cue movement patterns, analyze and modify body mechanics/ergonomics, assess and modify postural abnormalities, address imbalance/dizziness and instruct in home and community integration to attain remaining goals. []  See Plan of Care  []  See progress note/recertification  []  See Discharge Summary         Progress towards goals / Updated goals:  Goal: Pt to increase R knee AROM of 0-122 deg to increase ease with negotiating stairs at home. Status at last note/certification: R knee 0-115  Goal: Pt to report 0/10 pain on average to perform yard work without difficulty. Status at last note/certification: 5/21  Goal: Pt to report FOTO score of 61 pts to be able to perform reciprocal pattern on stairs at home.   Status at last note/certification: 47  Goal: Pt to report ability to negotiate stairs with reciprocal step pattern with ascending and descending pattern consistently in order to improve ease of mobility in the home.   Status at last note/certification: Frequently step to    PLAN  [x]  Upgrade activities as tolerated     [x]  Continue plan of care  [x]  Update interventions per flow sheet       []  Discharge due to:_  []  Other:_      Kirill Blackman, PT 8/28/2017  11:58 AM    Future Appointments  Date Time Provider Avis Fannie   8/28/2017 12:00 PM Kirill Blackman, [facility]   8/29/2017 9:30 AM Kyung Lopez  [address] [place]   8/30/2017 10:00 AM Kirill Blackman, [facility]   9/5/2017 3:30 PM Renita Wolfe, [facility]   9/7/2017 10:30 AM Kirill Blackman, [facility]   9/14/2017 3:30 PM Renita Wolfe, [facility]   9/15/2017 3:30 PM Kirill Blackman, [facility]   9/19/2017 3:30 PM Renita Wolfe, [facility]   9/22/2017 3:30 PM Kirill Blackman, [facility]

## 2017-08-29 ENCOUNTER — APPOINTMENT (OUTPATIENT)
Dept: PHYSICAL THERAPY | Age: 72
End: 2017-08-29
Payer: MEDICARE

## 2017-08-29 ENCOUNTER — HOSPITAL ENCOUNTER (OUTPATIENT)
Dept: LAB | Age: 72
Discharge: HOME | End: 2017-08-29
Payer: MEDICARE

## 2017-08-29 ENCOUNTER — OFFICE VISIT (OUTPATIENT)
Dept: INTERNAL MEDICINE CLINIC | Age: 72
End: 2017-08-29

## 2017-08-29 VITALS
DIASTOLIC BLOOD PRESSURE: 88 MMHG | BODY MASS INDEX: 22.51 KG/M2 | HEIGHT: 69 IN | SYSTOLIC BLOOD PRESSURE: 146 MMHG | TEMPERATURE: 97.6 F | WEIGHT: 152 LBS | OXYGEN SATURATION: 97 % | RESPIRATION RATE: 16 BRPM | HEART RATE: 82 BPM

## 2017-08-29 DIAGNOSIS — I25.10 CORONARY ARTERY DISEASE INVOLVING NATIVE HEART WITHOUT ANGINA PECTORIS, UNSPECIFIED VESSEL OR LESION TYPE: ICD-10-CM

## 2017-08-29 DIAGNOSIS — Z01.818 PRE-OP EXAM: ICD-10-CM

## 2017-08-29 DIAGNOSIS — M17.0 PRIMARY OSTEOARTHRITIS OF BOTH KNEES: ICD-10-CM

## 2017-08-29 DIAGNOSIS — Z01.818 PRE-OP EXAM: Primary | ICD-10-CM

## 2017-08-29 LAB
ANION GAP SERPL CALC-SCNC: 7 MMOL/L (ref 3–18)
BASOPHILS # BLD: 0.1 K/UL (ref 0–0.06)
BASOPHILS NFR BLD: 1 % (ref 0–2)
BUN SERPL-MCNC: 18 MG/DL (ref 7–18)
BUN/CREAT SERPL: 20 (ref 12–20)
CALCIUM SERPL-MCNC: 9.4 MG/DL (ref 8.5–10.1)
CHLORIDE SERPL-SCNC: 98 MMOL/L (ref 100–108)
CO2 SERPL-SCNC: 30 MMOL/L (ref 21–32)
CREAT SERPL-MCNC: 0.91 MG/DL (ref 0.6–1.3)
DIFFERENTIAL METHOD BLD: ABNORMAL
EOSINOPHIL # BLD: 0.1 K/UL (ref 0–0.4)
EOSINOPHIL NFR BLD: 1 % (ref 0–5)
ERYTHROCYTE [DISTWIDTH] IN BLOOD BY AUTOMATED COUNT: 13.2 % (ref 11.6–14.5)
GLUCOSE SERPL-MCNC: 91 MG/DL (ref 74–99)
HCT VFR BLD AUTO: 47.9 % (ref 36–48)
HGB BLD-MCNC: 16.2 G/DL (ref 13–16)
LYMPHOCYTES # BLD: 1.2 K/UL (ref 0.9–3.6)
LYMPHOCYTES NFR BLD: 14 % (ref 21–52)
MCH RBC QN AUTO: 32.9 PG (ref 24–34)
MCHC RBC AUTO-ENTMCNC: 33.8 G/DL (ref 31–37)
MCV RBC AUTO: 97.2 FL (ref 74–97)
MONOCYTES # BLD: 0.5 K/UL (ref 0.05–1.2)
MONOCYTES NFR BLD: 6 % (ref 3–10)
NEUTS SEG # BLD: 6.7 K/UL (ref 1.8–8)
NEUTS SEG NFR BLD: 78 % (ref 40–73)
PLATELET # BLD AUTO: 205 K/UL (ref 135–420)
PMV BLD AUTO: 10 FL (ref 9.2–11.8)
POTASSIUM SERPL-SCNC: 4.9 MMOL/L (ref 3.5–5.5)
RBC # BLD AUTO: 4.93 M/UL (ref 4.7–5.5)
SODIUM SERPL-SCNC: 135 MMOL/L (ref 136–145)
WBC # BLD AUTO: 8.5 K/UL (ref 4.6–13.2)

## 2017-08-29 PROCEDURE — 36415 COLL VENOUS BLD VENIPUNCTURE: CPT | Performed by: INTERNAL MEDICINE

## 2017-08-29 PROCEDURE — 85025 COMPLETE CBC W/AUTO DIFF WBC: CPT | Performed by: INTERNAL MEDICINE

## 2017-08-29 PROCEDURE — 80048 BASIC METABOLIC PNL TOTAL CA: CPT | Performed by: INTERNAL MEDICINE

## 2017-08-29 RX ORDER — IBUPROFEN 200 MG
TABLET ORAL
COMMUNITY

## 2017-08-29 NOTE — PROGRESS NOTES
The patient presents to the office today with the chief complaint of right knee pain and for a pre op evaluation    HPI    Leoncio Duque complains of right knee pain secondary to osteoarthritis. he is now scheduled for surgical correction. Other than the knee pain the patient has no complaints. 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 The patient denies chest pain or dyspnea. The patient has felt down at times secondary to the disability caused by the knee pain. This is doing better on Zoloft    Review of Systems   Respiratory: Negative for shortness of breath. Cardiovascular: Negative for chest pain and leg swelling. Musculoskeletal: Positive for joint pain. Allergies   Allergen Reactions    Lisinopril Other (comments)     Burning    Codeine Other (comments)     Pt reports manic reactions.  Droperidol Nausea Only       Current Outpatient Prescriptions   Medication Sig Dispense Refill    ibuprofen (ADVIL) 200 mg tablet Take  by mouth.  ASPIRIN (ASPIR-81 PO) Take  by mouth.  sertraline (ZOLOFT) 25 mg tablet Take  by mouth daily.  multivitamin (ONE A DAY) tablet Take 1 Tab by mouth daily.  Cholecalciferol, Vitamin D3, 3,000 unit tab Take 2,000 Units by mouth. History reviewed. No pertinent past medical history. Past Surgical History:   Procedure Laterality Date    CARDIAC SURG PROCEDURE UNLIST      Quad bipass surgery 2014       Social History     Social History    Marital status: UNKNOWN     Spouse name: N/A    Number of children: N/A    Years of education: N/A     Occupational History    Not on file.      Social History Main Topics    Smoking status: Never Smoker    Smokeless tobacco: Never Used    Alcohol use Yes      Comment: Occationaly    Drug use: No    Sexual activity: Not on file     Other Topics Concern    Not on file     Social History Narrative       Patient does not have an advanced directive on file    Visit Vitals    /88 (BP 1 Location: Left arm, BP Patient Position: Sitting)    Pulse 82    Temp 97.6 °F (36.4 °C) (Tympanic)    Resp 16    Ht 5' 9\" (1.753 m)    Wt 152 lb (68.9 kg)    SpO2 97%    BMI 22.45 kg/m2       Physical Exam   No Cervical Lymphadenopathy  No Supraclavicular Lymphadenopathy  Thyroid is Normal  Lungs are clear to ausculation and percussion  Heart:  S1 S2 are normal, No gallops, No mummers  No Carotid Bruits  Abdomen:  Normal Bowel Sounds. No tenderness. No masses. No Hepatomegaly or Splenomegly  LE:  Strong Pedal Pulses. No Edema  Moderate osteoarthritis is present in both knees    BMI:  [facility] [place] [facility] [place] on 08/29/2017   Component Date Value Ref Range Status    WBC 08/29/2017 8.5  4.6 - 13.2 K/uL Final    RBC 08/29/2017 4.93  4.70 - 5.50 M/uL Final    HGB 08/29/2017 16.2* 13.0 - 16.0 g/dL Final    HCT 08/29/2017 47.9  36.0 - 48.0 % Final    MCV 08/29/2017 97.2* 74.0 - 97.0 FL Final    MCH 08/29/2017 32.9  24.0 - 34.0 PG Final    MCHC 08/29/2017 33.8  31.0 - 37.0 g/dL Final    RDW 08/29/2017 13.2  11.6 - 14.5 % Final    PLATELET 51/14/4448 083  135 - 420 K/uL Final    MPV 08/29/2017 10.0  9.2 - 11.8 FL Final    NEUTROPHILS 08/29/2017 78* 40 - 73 % Final    LYMPHOCYTES 08/29/2017 14* 21 - 52 % Final    MONOCYTES 08/29/2017 6  3 - 10 % Final    EOSINOPHILS 08/29/2017 1  0 - 5 % Final    BASOPHILS 08/29/2017 1  0 - 2 % Final    ABS. NEUTROPHILS 08/29/2017 6.7  1.8 - 8.0 K/UL Final    ABS. LYMPHOCYTES 08/29/2017 1.2  0.9 - 3.6 K/UL Final    ABS. MONOCYTES 08/29/2017 0.5  0.05 - 1.2 K/UL Final    ABS. EOSINOPHILS 08/29/2017 0.1  0.0 - 0.4 K/UL Final    ABS.  BASOPHILS 08/29/2017 0.1* 0.0 - 0.06 K/UL Final    DF 08/29/2017 AUTOMATED    Final    Sodium 08/29/2017 135* 136 - 145 mmol/L Final    Potassium 08/29/2017 4.9  3.5 - 5.5 mmol/L Final    Chloride 08/29/2017 98* 100 - 108 mmol/L Final    CO2 08/29/2017 30  21 - 32 mmol/L Final    Anion gap 08/29/2017 7  3.0 - 18 mmol/L Final    Glucose 08/29/2017 91  74 - 99 mg/dL Final    BUN 08/29/2017 18  7.0 - 18 MG/DL Final    Creatinine 08/29/2017 0.91  0.6 - 1.3 MG/DL Final    BUN/Creatinine ratio 08/29/2017 20  12 - 20   Final    GFR est AA 08/29/2017 >60  >60 ml/min/1.73m2 Final    GFR est non-AA 08/29/2017 >60  >60 ml/min/1.73m2 Final    Comment: (NOTE)  Estimated GFR is calculated using the Modification of Diet in Renal   Disease (MDRD) Study equation, reported for both  Americans   (GFRAA) and non- Americans (GFRNA), and normalized to 1.73m2   body surface area. The physician must decide which value applies to   the patient. The MDRD study equation should only be used in   individuals age 25 or older. It has not been validated for the   following: pregnant women, patients with serious comorbid conditions,   or on certain medications, or persons with extremes of body size,   muscle mass, or nutritional status.  Calcium 08/29/2017 9.4  8.5 - 10.1 MG/DL Final       .  Results for orders placed or performed during the hospital encounter of 08/29/17   CBC WITH AUTOMATED DIFF   Result Value Ref Range    WBC 8.5 4.6 - 13.2 K/uL    RBC 4.93 4.70 - 5.50 M/uL    HGB 16.2 (H) 13.0 - 16.0 g/dL    HCT 47.9 36.0 - 48.0 %    MCV 97.2 (H) 74.0 - 97.0 FL    MCH 32.9 24.0 - 34.0 PG    MCHC 33.8 31.0 - 37.0 g/dL    RDW 13.2 11.6 - 14.5 %    PLATELET 640 262 - 279 K/uL    MPV 10.0 9.2 - 11.8 FL    NEUTROPHILS 78 (H) 40 - 73 %    LYMPHOCYTES 14 (L) 21 - 52 %    MONOCYTES 6 3 - 10 %    EOSINOPHILS 1 0 - 5 %    BASOPHILS 1 0 - 2 %    ABS. NEUTROPHILS 6.7 1.8 - 8.0 K/UL    ABS. LYMPHOCYTES 1.2 0.9 - 3.6 K/UL    ABS. MONOCYTES 0.5 0.05 - 1.2 K/UL    ABS. EOSINOPHILS 0.1 0.0 - 0.4 K/UL    ABS.  BASOPHILS 0.1 (H) 0.0 - 0.06 K/UL    DF AUTOMATED     METABOLIC PANEL, BASIC   Result Value Ref Range    Sodium 135 (L) 136 - 145 mmol/L    Potassium 4.9 3.5 - 5.5 mmol/L    Chloride 98 (L) 100 - 108 mmol/L    CO2 30 21 - 32 mmol/L    Anion gap 7 3.0 - 18 mmol/L    Glucose 91 74 - 99 mg/dL    BUN 18 7.0 - 18 MG/DL    Creatinine 0.91 0.6 - 1.3 MG/DL    BUN/Creatinine ratio 20 12 - 20      GFR est AA >60 >60 ml/min/1.73m2    GFR est non-AA >60 >60 ml/min/1.73m2    Calcium 9.4 8.5 - 10.1 MG/DL   Results for orders placed or performed in visit on 08/29/17   AMB POC EKG ROUTINE W/ 12 LEADS, INTER & REP    Impression    Counterclockwise rotation in the precordial leads suggestive of chest lead placement. Normal EKG       Pre op testing:  Normal as above    Assessment / Plan      ICD-10-CM ICD-9-CM    1. Pre-op exam Z01.818 V72.84 AMB POC EKG ROUTINE W/ 12 LEADS, INTER & REP      CBC WITH AUTOMATED DIFF      METABOLIC PANEL, BASIC   2. Coronary artery disease involving native heart without angina pectoris, unspecified vessel or lesion type I25.10 414.01 AMB POC EKG ROUTINE W/ 12 LEADS, INTER & REP      CBC WITH AUTOMATED DIFF      METABOLIC PANEL, BASIC   3. Primary osteoarthritis of both knees M17.0 715.16 CBC WITH AUTOMATED DIFF      METABOLIC PANEL, BASIC     THE PATIENT IS OK FOR SURGERY    Follow-up Disposition:  Return in about 4 months (around 12/18/2017). I asked Roberta Hansen if he has any questions and I answered the questions.   Roberta Hansen states that he understands the treatment plan and agrees with the treatment plan

## 2017-08-29 NOTE — PROGRESS NOTES
1. Have you been to the ER, urgent care clinic since your last visit? Hospitalized since your last visit? No    2. Have you seen or consulted any other health care providers outside of the [address] [place] [address] since your last visit? Include any pap smears or colon screening.  Dr. Army Mobley; Dr. Mervin Ivory on right knee

## 2017-08-29 NOTE — PATIENT INSTRUCTIONS
Medicare Wellness Visit, Male    The best way to improve and maintain good health is to have a healthy lifestyle by eating a well-balanced diet, exercising regularly, limiting alcohol and stopping smoking. Regular visits with your physician or non-physician health care provider also support your good health. Preventive screening tests can find health problems before they become diseases or illnesses. Preventive services such as immunizations prevent serious infections. All people over age 72 should have a Pneumovax and a Prevnar-13 shot to prevent potentially life threatening infections with the pneumococcus bacteria, a common cause of pneumonia. These are once in a lifetime unless you and your provider decide differently. All people over 65 should have a yearly influenza vaccine or \"flu\" shot. This does not prevent infection with cold viruses but has been proven to prevent hospitalization and death from influenza. Although Medicare part B \"regular Medicare\" currently only covers tetanus vaccination in the context of an injury, a tetanus vaccine (Tdap or Td) is recommended every 10 years. A shingles vaccine is recommended once in a lifetime after age 61. The Shingles vaccine is also not covered by Medicare part B. Note, however, that both the Shingles vaccine and Tdap/Td are generally covered by secondary carriers. Please check your coverage and out of pocket expenses. Consider contacting your local health department because it may stock these vaccines for a reasonable charge. We currently have documentation of the following immunization history for you: There is no immunization history on file for this patient. Screening for infection with Hepatitis C is recommended for anyone born between [birth year] through [address]. The table at the bottom of this document indicates the status of this and other screening services.     Screening for diabetes mellitus with a blood sugar test (glucose) should be done at least every 3 years until age 79. You and your health care provider may decide whether to continue screening after age 79. The most recent blood glucose we have on file for you is: No results found for: GLU, GLUCPOC    Glaucoma is a disease of the eye due to increased ocular pressure that can lead to blindness. People with risk factors for glaucoma ( race, diabetes, family history) should be screened at least every 2 years by an eye professional. This may be covered annually if indicated as determined by you and your doctor. Cardiovascular screening tests that check for elevated lipids or cholesterol (fatty part of blood) which can lead to heart disease and strokes should be done every 4-6 years through age 79. You and your health care provider may decide whether to continue screening after age 79. The most recent lipid panel we have on file for you is:   No results found for: CHOL, CHOLPOCT, CHOLX, CHLST, CHOLV, HDL, HDLPOC, LDL, LDLCPOC, LDLC, DLDLP, VLDLC, VLDL, TGLX, TRIGL, TRIGP, TGLPOCT, CHHD, CHHDX    Colorectal cancer screening that evaluates for blood or polyps in your colon for people with average risk should be done yearly as a stool test, every five years as a flexible sigmoidoscope or every 10 years as a colonoscopy up to age 76. You and your health care provider may decide whether to continue screening after age 76. Men up to age 76 may elect to screen for prostate cancer with a blood test called a PSA at certain intervals, depending on their personal and family history. This decision is between the patient and his provider. The most recent PSA values we have on file for you are:  No results found for: PSA, PSA2, Render Ebbs, [id number], [id number], PSALT    If you have been a smoker or had family history of abdominal aortic aneurysms, you and your provider may decide to schedule an ultrasound test of your aorta.  If you have questions regarding this please ask your health care provider    People who are between age 54and [de-identified]years of age and have smoked the equivalent of 1 pack per day for 30 years or more may benefit from screening for lung cancer with a yearly low dose CT scan until they have been non smokers for 15 years. If you have questions regarding karen please ask your health care provider    Your Medicare Wellness Exam is recommended annually.

## 2017-08-29 NOTE — MR AVS SNAPSHOT
Visit Information Date & Time Provider Department Dept. Phone Encounter #  
 8/29/2017  9:30 AM Tad Manzano MD [facility]IC[facility] OF [facility] [phone] [id number] Follow-up Instructions Return in about 4 months (around 12/18/2017). Upcoming Health Maintenance Date Due DTaP/Tdap/Td series (1 - Tdap) 2/12/1965 FOBT Q 1 YEAR AGE 50-75 2/12/1994 ZOSTER VACCINE AGE 60> 12/12/2003 GLAUCOMA SCREENING Q2Y 2/12/2009 Pneumococcal 65+ Low/Medium Risk (1 of 2 - PCV13) 2/12/2009 MEDICARE YEARLY EXAM 2/12/2009 INFLUENZA AGE 9 TO ADULT 8/1/2017 Allergies as of 8/29/2017  Review Complete On: 8/29/2017 By: Tad Manzano MD  
  
 Severity Noted Reaction Type Reactions Lisinopril Medium 08/08/2017    Other (comments) Burning Codeine  03/09/2017    Other (comments) Pt reports manic reactions. Droperidol  03/09/2017    Nausea Only Current Immunizations  Never Reviewed No immunizations on file. Not reviewed this visit You Were Diagnosed With   
  
 Codes Comments Coronary artery disease involving native heart without angina pectoris, unspecified vessel or lesion type    -  Primary ICD-10-CM: I25.10 ICD-9-CM: 414.01 Pre-op exam     ICD-10-CM: X59.761 ICD-9-CM: V72.84 Primary osteoarthritis of both knees     ICD-10-CM: M17.0 ICD-9-CM: 715.16 Vitals BP Pulse Temp Resp Height(growth percentile) 146/88 (BP 1 Location: Left arm, BP Patient Position: Sitting) 82 97.6 °F (36.4 °C) (Tympanic) 16 5' 9\" (1.753 m) Weight(growth percentile) SpO2 BMI Smoking Status 152 lb (68.9 kg) 97% 22.45 kg/m2 Never Smoker BMI and BSA Data Body Mass Index Body Surface Area  
 22.45 kg/m 2 1.83 m 2 Preferred Pharmacy Pharmacy Name Phone [facility] [address] [place] [address] João Begum [address] [place] [address] [phone] Your Updated Medication List  
  
   
 This list is accurate as of: 8/29/17 11:20 AM.  Always use your most recent med list. ADVIL 200 mg tablet Generic drug:  ibuprofen Take  by mouth. ASPIR-81 PO Take  by mouth. Cholecalciferol (Vitamin D3) 3,000 unit Tab Take 2,000 Units by mouth.  
  
 multivitamin tablet Commonly known as:  ONE A DAY Take 1 Tab by mouth daily. ZOLOFT 25 mg tablet Generic drug:  sertraline Take  by mouth daily. We Performed the Following AMB POC EKG ROUTINE W/ 12 LEADS, INTER & REP [84039 CPT(R)] Follow-up Instructions Return in about 4 months (around 12/18/2017). To-Do List   
 08/30/2017 10:00 AM  
  Appointment with Shirley Burns PT at [facility] ([phone]) 09/05/2017 3:30 PM  
  Appointment with Ileana Shipman PT at [facility] ([phone])  
  
 09/07/2017 10:30 AM  
  Appointment with Shirley Burns PT at [facility] ([phone])  
  
 09/14/2017 3:30 PM  
  Appointment with Ileana Shipman PT at [facility] ([phone])  
  
 09/15/2017 3:30 PM  
  Appointment with Shirley Burns PT at [facility] ([phone])  
  
 09/19/2017 3:30 PM  
  Appointment with Ileana Shipman PT at [facility] ([phone])  
  
 09/22/2017 3:30 PM  
  Appointment with Shirley Burns PT at [facility] ([phone]) Patient Instructions Medicare Wellness Visit, Male The best way to improve and maintain good health is to have a healthy lifestyle by eating a well-balanced diet, exercising regularly, limiting alcohol and stopping smoking. Regular visits with your physician or non-physician health care provider also support your good health. Preventive screening tests can find health problems before they become diseases or illnesses. Preventive services such as immunizations prevent serious infections. All people over age 72 should have a Pneumovax and a Prevnar-13 shot to prevent potentially life threatening infections with the pneumococcus bacteria, a common cause of pneumonia. These are once in a lifetime unless you and your provider decide differently. All people over 65 should have a yearly influenza vaccine or \"flu\" shot. This does not prevent infection with cold viruses but has been proven to prevent hospitalization and death from influenza. Although Medicare part B \"regular Medicare\" currently only covers tetanus vaccination in the context of an injury, a tetanus vaccine (Tdap or Td) is recommended every 10 years. A shingles vaccine is recommended once in a lifetime after age 61. The Shingles vaccine is also not covered by Medicare part B. Note, however, that both the Shingles vaccine and Tdap/Td are generally covered by secondary carriers. Please check your coverage and out of pocket expenses. Consider contacting your local health department because it may stock these vaccines for a reasonable charge. We currently have documentation of the following immunization history for you: There is no immunization history on file for this patient. Screening for infection with Hepatitis C is recommended for anyone born between [birth year] through [address]. The table at the bottom of this document indicates the status of this and other screening services. Screening for diabetes mellitus with a blood sugar test (glucose) should be done at least every 3 years until age 79. You and your health care provider may decide whether to continue screening after age 79. The most recent blood glucose we have on file for you is: No results found for: GLU, GLUCPOC Glaucoma is a disease of the eye due to increased ocular pressure that can lead to blindness.  People with risk factors for glaucoma ( race, diabetes, family history) should be screened at least every 2 years by an eye professional. This may be covered annually if indicated as determined by you and your doctor. Cardiovascular screening tests that check for elevated lipids or cholesterol (fatty part of blood) which can lead to heart disease and strokes should be done every 4-6 years through age 79. You and your health care provider may decide whether to continue screening after age 79. The most recent lipid panel we have on file for you is: No results found for: CHOL, CHOLPOCT, CHOLX, CHLST, CHOLV, HDL, HDLPOC, LDL, LDLCPOC, LDLC, DLDLP, VLDLC, VLDL, TGLX, TRIGL, TRIGP, TGLPOCT, CHHD, CHHDX Colorectal cancer screening that evaluates for blood or polyps in your colon for people with average risk should be done yearly as a stool test, every five years as a flexible sigmoidoscope or every 10 years as a colonoscopy up to age 76. You and your health care provider may decide whether to continue screening after age 76. Men up to age 76 may elect to screen for prostate cancer with a blood test called a PSA at certain intervals, depending on their personal and family history. This decision is between the patient and his provider. The most recent PSA values we have on file for you are: No results found for: PSA, Rigo Cervantes, PSAR3, [id number], [id number], PSALT If you have been a smoker or had family history of abdominal aortic aneurysms, you and your provider may decide to schedule an ultrasound test of your aorta. If you have questions regarding this please ask your health care provider People who are between age 54and [de-identified]years of age and have smoked the equivalent of 1 pack per day for 30 years or more may benefit from screening for lung cancer with a yearly low dose CT scan until they have been non smokers for 15 years. If you have questions regarding karen please ask your health care provider Your Medicare Wellness Exam is recommended annually. Introducing [facility]! Ernestine Garcia introduces [employer] patient portal. Now you can access parts of your medical record, email your doctor's office, and request medication refills online. 1. In your internet browser, go to [email or web address] 2. Click on the First Time User? Click Here link in the Sign In box. You will see the New Member Sign Up page. 3. Enter your [employer] Access Code exactly as it appears below. You will not need to use this code after youve completed the sign-up process. If you do not sign up before the expiration date, you must request a new code. · [employer] Access Code: [email or web address] Expires: 10/10/2017  1:40 PM 
 
4. Enter the last four digits of your Social Security Number (xxxx) and Date of Birth (mm/dd/yyyy) as indicated and click Submit. You will be taken to the next sign-up page. 5. Create a [employer] ID. This will be your [employer] login ID and cannot be changed, so think of one that is secure and easy to remember. 6. Create a [employer] password. You can change your password at any time. 7. Enter your Password Reset Question and Answer. This can be used at a later time if you forget your password. 8. Enter your e-mail address. You will receive e-mail notification when new information is available in [address] 9. Click Sign Up. You can now view and download portions of your medical record. 10. Click the Download Summary menu link to download a portable copy of your medical information. If you have questions, please visit the Frequently Asked Questions section of the [employer] website. Remember, [employer] is NOT to be used for urgent needs. For medical emergencies, dial 911. Now available from your iPhone and Android! Please provide this summary of care documentation to your next provider. Your primary care clinician is listed as Kenyon Tong. If you have any questions after today's visit, please call [phone].

## 2017-08-30 ENCOUNTER — HOSPITAL ENCOUNTER (OUTPATIENT)
Dept: PHYSICAL THERAPY | Age: 72
End: 2017-08-30
Payer: MEDICARE

## 2017-08-31 ENCOUNTER — APPOINTMENT (OUTPATIENT)
Dept: PHYSICAL THERAPY | Age: 72
End: 2017-08-31
Payer: MEDICARE

## 2017-09-05 ENCOUNTER — HOSPITAL ENCOUNTER (OUTPATIENT)
Dept: PHYSICAL THERAPY | Age: 72
Discharge: HOME | End: 2017-09-05
Payer: MEDICARE

## 2017-09-05 PROCEDURE — 97140 MANUAL THERAPY 1/> REGIONS: CPT

## 2017-09-05 PROCEDURE — 97112 NEUROMUSCULAR REEDUCATION: CPT

## 2017-09-05 PROCEDURE — 97110 THERAPEUTIC EXERCISES: CPT

## 2017-09-05 NOTE — PROGRESS NOTES
PT DAILY TREATMENT NOTE - [facility]     Patient Name: Garo Tesfaye  Date:2017  : 1945  [x]  Patient  Verified  Payor: Halima Callejas / Plan: [place] MEDICARE PART A & B / Product Type: Medicare /    In time:3:10  Out time:4:05  Total Treatment Time (min): 55  Total Timed Codes (min): 45  1:1 Treatment Time ( [address] only): 45  Visit #: 2 of 10    Treatment Area: Right leg pain [M79.604]  Left leg pain [M79.605]    SUBJECTIVE  Pain Level (0-10 scale): 1-2/10  Any medication changes, allergies to medications, adverse drug reactions, diagnosis change, or new procedure performed?: [x] No    [] Yes (see summary sheet for update)  Subjective functional status/changes:   [] No changes reported  \"I have no real pain at rest but once I start moving, its about a 2-3/10. I had the surgery and I've been riding the bike about 15 minutes everyday per the MD orders but I don't know if I can do that today because the thigh just feels so stiff. \"     OBJECTIVE    Modality rationale: decrease inflammation, decrease pain and increase tissue extensibility to improve the patients ability to improve activity tolerance   Min Type Additional Details    [] Estim:  []Unatt       []IFC  []Premod                        []Other:  []w/ice   []w/heat  Position:  Location:    [] Estim: []Att    []TENS instruct  []NMES                    []Other:  []w/US   []w/ice   []w/heat  Position:  Location:    []  Traction: [] Cervical       []Lumbar                       [] Prone          []Supine                       []Intermittent   []Continuous Lbs:  [] before manual  [] after manual    []  Ultrasound: []Continuous   [] Pulsed                           []1MHz   []3MHz W/cm2:  Location:    []  Iontophoresis with dexamethasone         Location: [] Take home patch   [] In clinic   10 [x]  Ice     []  heat  []  Ice massage  []  Laser   []  Anodyne Position:supine  Location:R knee    []  Laser with stim  []  Other:  Position:  Location:    []  Vasopneumatic Device Pressure:       [] lo [] med [] hi   Temperature: [] lo [] med [] hi   [] Skin assessment post-treatment:  []intact []redness- no adverse reaction    []redness - adverse reaction:     20 min Therapeutic Exercise:  [] See flow sheet :   Rationale: increase ROM and increase strength to improve the patients ability to improve knee bending    17 min Neuromuscular Re-education:  [x]  See flow sheet :   Rationale: increase strength, improve coordination, improve balance and increase proprioception  to improve the patients ability to improve knee stability      8 min Manual Therapy:  STM to R quad; manual knee flexion stretch   Rationale: increase ROM, increase tissue extensibility and decrease trigger points to improve R knee AROM    With   [] TE   [] TA   [] neuro   [] other: Patient Education: [x] Review HEP    [] Progressed/Changed HEP based on:   [] positioning   [] body mechanics   [] transfers   [] heat/ice application    [] other:      Other Objective/Functional Measures: Progressed Rx program per flow sheet. Pain Level (0-10 scale) post treatment: 0/10    ASSESSMENT/Changes in Function: Pt continues to be hesitant with all movements and reports over-doing it with activities at home. Pt's spouse reports its more mental and that the surgeon's instructions were to not \"baby the leg as no more damage can be done but to keep moving. \"  Spouse and PT encouraged Pt to do more throughout session to work towards loosening LE. Pt advised to do knee flexion stretch, stool scoots using computer chair, and stair negotiation at home.     Patient will continue to benefit from skilled PT services to modify and progress therapeutic interventions, address functional mobility deficits, address ROM deficits, address strength deficits, analyze and address soft tissue restrictions, analyze and cue movement patterns, analyze and modify body mechanics/ergonomics, assess and modify postural abnormalities, address imbalance/dizziness and instruct in home and community integration to attain remaining goals. []  See Plan of Care  []  See progress note/recertification  []  See Discharge Summary         Progress towards goals / Updated goals:  Goal: Pt to increase R knee AROM of 0-122 deg to increase ease with negotiating stairs at home. Status at last note/certification: R knee 0-115  Goal: Pt to report 0/10 pain on average to perform yard work without difficulty. Status at last note/certification: 7/50  Goal: Pt to report FOTO score of 61 pts to be able to perform reciprocal pattern on stairs at home. Status at last note/certification: 47  Goal: Pt to report ability to negotiate stairs with reciprocal step pattern with ascending and descending pattern consistently in order to improve ease of mobility in the home.   Status at last note/certification: Frequently step to    PLAN  [x]  Upgrade activities as tolerated     [x]  Continue plan of care  []  Update interventions per flow sheet       []  Discharge due to:_  [x]  Other:_goals next visit      Renita Wolfe PT 9/5/2017  11:58 AM    Future Appointments  Date Time Provider Avis Greco   9/7/2017 10:30 AM Marina Belle, PT [facility]   9/14/2017 3:30 PM Renita Wolfe, PT [facility]   9/15/2017 3:30 PM Marina Belle, PT [facility]   9/19/2017 3:30 PM Renita Wolfe PT [facility]   9/22/2017 3:30 PM Marina Belle, PT [facility]

## 2017-09-07 ENCOUNTER — HOSPITAL ENCOUNTER (OUTPATIENT)
Dept: PHYSICAL THERAPY | Age: 72
Discharge: HOME | End: 2017-09-07
Payer: MEDICARE

## 2017-09-07 PROCEDURE — 97014 ELECTRIC STIMULATION THERAPY: CPT

## 2017-09-07 PROCEDURE — 97140 MANUAL THERAPY 1/> REGIONS: CPT

## 2017-09-07 NOTE — PROGRESS NOTES
PT DAILY TREATMENT NOTE - [facility]     Patient Name: Oracio Garcia  Date:2017  : 1945  [x]  Patient  Verified  Payor: Roberto Carlos Meredith / Plan: [place] MEDICARE PART A & B / Product Type: Medicare /    In time:3:35  Out time:4:10  Total Treatment Time (min): 35  Total Timed Codes (min): 25  1:1 Treatment Time ( [address] only): 25  Visit #: 3 of 10    Treatment Area: Right leg pain [M79.604]  Left leg pain [M79.605]    SUBJECTIVE  Pain Level (0-10 scale): 1/10  Any medication changes, allergies to medications, adverse drug reactions, diagnosis change, or new procedure performed?: [x] No    [] Yes (see summary sheet for update)  Subjective functional status/changes:   [] No changes reported  \"I drove up to [facility] yesterday and was in the car for about 3 hrs each way. My knee is so stiff today and I can hardly walk or bend my knee. I don't think I can do exercises today. \"     OBJECTIVE    Modality rationale: decrease inflammation, decrease pain and increase tissue extensibility to improve the patients ability to improve activity tolerance   Min Type Additional Details   10 [x] Estim:  [x]Unatt       [x]IFC  []Premod                        []Other:  []w/ice   [x]w/heat  Position: supine  Location: R quad    [] Estim: []Att    []TENS instruct  []NMES                    []Other:  []w/US   []w/ice   []w/heat  Position:  Location:    []  Traction: [] Cervical       []Lumbar                       [] Prone          []Supine                       []Intermittent   []Continuous Lbs:  [] before manual  [] after manual    []  Ultrasound: []Continuous   [] Pulsed                           []1MHz   []3MHz W/cm2:  Location:    []  Iontophoresis with dexamethasone         Location: [] Take home patch   [] In clinic    []  Ice     []  heat  []  Ice massage  []  Laser   []  Anodyne Position:  Location:    []  Laser with stim  []  Other:  Position:  Location:    []  Vasopneumatic Device Pressure:       [] lo [] med [] hi Temperature: [] lo [] med [] hi   [] Skin assessment post-treatment:  []intact []redness- no adverse reaction    []redness - adverse reaction:      25 min Manual Therapy:  STM to R quad; manual knee flexion stretch   Rationale: increase ROM, increase tissue extensibility and decrease trigger points to improve R knee AROM    With   [] TE   [] TA   [] neuro   [] other: Patient Education: [x] Review HEP    [] Progressed/Changed HEP based on:   [] positioning   [] body mechanics   [] transfers   [] heat/ice application    [] other:      Other Objective/Functional Measures: Held exercises today and focused on manual therapy to reduce soft tissue restrictions and improve R knee mobility. Initiated E-stim to help with pain control and increasing tissue extensibility. Pain Level (0-10 scale) post treatment: 0/10    ASSESSMENT/Changes in Function: Pt continues to exhibit limited AROM of R knee due to tightness and pain. Pt mentioned to front office that he would like to transfer to a location in [place] to do certain type of therapy. Will contact Pt to find out more information. Patient will continue to benefit from skilled PT services to modify and progress therapeutic interventions, address functional mobility deficits, address ROM deficits, address strength deficits, analyze and address soft tissue restrictions, analyze and cue movement patterns, analyze and modify body mechanics/ergonomics, assess and modify postural abnormalities, address imbalance/dizziness and instruct in home and community integration to attain remaining goals. []  See Plan of Care  []  See progress note/recertification  []  See Discharge Summary         Progress towards goals / Updated goals:  Goal: Pt to increase R knee AROM of 0-122 deg to increase ease with negotiating stairs at home.   Status at last note/certification: R knee 0-115  Current status: reassess next visit  Goal: Pt to report 0/10 pain on average to perform yard work without difficulty. Status at last note/certification: 2/30  Current status: progressing - 1/10 pain on average  Goal: Pt to report FOTO score of 61 pts to be able to perform reciprocal pattern on stairs at home. Status at last note/certification: 47  Current status: not met - FOTO 37 pts  Goal: Pt to report ability to negotiate stairs with reciprocal step pattern with ascending and descending pattern consistently in order to improve ease of mobility in the home.   Status at last note/certification: Frequently step to  Current status: not met - step to pattern    PLAN  [x]  Upgrade activities as tolerated     [x]  Continue plan of care  []  Update interventions per flow sheet       []  Discharge due to:_  [x]  Other:_goals next visit      Renita Wolfe PT 9/7/2017  11:58 AM    Future Appointments  Date Time Provider Avis Greco   9/14/2017 3:30 PM Marisol Wolfe, [facility]   9/15/2017 3:30 PM Kaen Fuller, PT [facility]   9/19/2017 3:30 PM Renita Wolfe, PT [facility]   9/22/2017 3:30 PM Kane Fuller, [facility]

## 2017-09-14 ENCOUNTER — APPOINTMENT (OUTPATIENT)
Dept: PHYSICAL THERAPY | Age: 72
End: 2017-09-14
Payer: MEDICARE

## 2017-09-14 NOTE — PROGRESS NOTES
[facility] - [address]  [phone] fax    Discharge Summary    Patient name: Chacha Magaña Start of Care: 2017   Referral source: Macho Hart MD : 1945                         Medical Diagnosis: Right leg pain [M79.604]  Left leg pain [M79.605] Onset Date:One month ago                         Treatment Diagnosis: Right Knee Pain   Prior Hospitalization: see medical history Provider#: [id number]   Medications: Verified on Patient summary List    Comorbidities: Heart disease; Depression; Right quadriceps tendon rupture/surgery 2016   Prior Level of Function: Lives in [facility] with spouse; ; functionally independent    Visits from Start of Care: 9    Missed Visits: 2    Reporting Period : 17 to 17    Goal: Pt to increase R knee AROM of 0-122 deg to increase ease with negotiating stairs at home. Status at last note/certification: R knee 0-115  Current status: not met - unable to reassess  Goal: Pt to report 0/10 pain on average to perform yard work without difficulty. Status at last note/certification:   Current status: progressing - 1/10 pain on average  Goal: Pt to report FOTO score of 61 pts to be able to perform reciprocal pattern on stairs at home. Status at last note/certification: 47  Current status: not met - FOTO 37 pts  Goal: Pt to report ability to negotiate stairs with reciprocal step pattern with ascending and descending pattern consistently in order to improve ease of mobility in the home. Status at last note/certification: Frequently step to  Current status: not met - step to pattern    Assessment/ Summary of Care: Pt made limited progress with therapy due to inconsistent attendance and continued complaints of pain and stiffness in R knee and anterior thigh that limited mobility.   Pt was apprehensive with all movements but was compliant with riding bike at home with support of spouse. Pt decided to transition to another PT location in [address] [place] [address], [address] [facility] at this time after trying out different therapy locations while a Pt here. Thus, Pt will be D/C at this time.   Thank you for this referral.    RECOMMENDATIONS:  [x]Discontinue therapy: []Patient has reached or is progressing toward set goals      [x]Patient is non-compliant or has abdicated      [x]Due to lack of appreciable progress towards set goals    Renita Wolfe, PT 9/14/2017 2:53 PM

## 2017-09-15 ENCOUNTER — APPOINTMENT (OUTPATIENT)
Dept: PHYSICAL THERAPY | Age: 72
End: 2017-09-15
Payer: MEDICARE

## 2017-09-19 ENCOUNTER — APPOINTMENT (OUTPATIENT)
Dept: PHYSICAL THERAPY | Age: 72
End: 2017-09-19
Payer: MEDICARE

## 2017-09-22 ENCOUNTER — APPOINTMENT (OUTPATIENT)
Dept: PHYSICAL THERAPY | Age: 72
End: 2017-09-22
Payer: MEDICARE

## 2017-11-03 RX ORDER — SERTRALINE HYDROCHLORIDE 25 MG/1
25 TABLET, FILM COATED ORAL DAILY
Qty: 30 TAB | Refills: 5 | Status: SHIPPED | OUTPATIENT
Start: 2017-11-03 | End: 2022-02-04 | Stop reason: ALTCHOICE

## 2018-01-15 ENCOUNTER — CLINICAL SUPPORT (OUTPATIENT)
Dept: INTERNAL MEDICINE CLINIC | Age: 73
End: 2018-01-15

## 2018-01-15 DIAGNOSIS — Z23 ENCOUNTER FOR IMMUNIZATION: ICD-10-CM

## 2018-08-17 ENCOUNTER — OFFICE VISIT (OUTPATIENT)
Dept: INTERNAL MEDICINE CLINIC | Age: 73
End: 2018-08-17

## 2018-08-17 VITALS
HEART RATE: 68 BPM | HEIGHT: 69 IN | TEMPERATURE: 97.4 F | RESPIRATION RATE: 18 BRPM | WEIGHT: 150 LBS | DIASTOLIC BLOOD PRESSURE: 68 MMHG | OXYGEN SATURATION: 97 % | BODY MASS INDEX: 22.22 KG/M2 | SYSTOLIC BLOOD PRESSURE: 120 MMHG

## 2018-08-17 DIAGNOSIS — F32.1 MODERATE SINGLE CURRENT EPISODE OF MAJOR DEPRESSIVE DISORDER (HCC): ICD-10-CM

## 2018-08-17 DIAGNOSIS — L72.3 SEBACEOUS CYST: Primary | ICD-10-CM

## 2018-08-17 DIAGNOSIS — M17.0 PRIMARY OSTEOARTHRITIS OF BOTH KNEES: ICD-10-CM

## 2018-08-17 RX ORDER — AMOXICILLIN 500 MG/1
CAPSULE ORAL
Refills: 0 | COMMUNITY
Start: 2018-08-13 | End: 2018-08-20 | Stop reason: ALTCHOICE

## 2018-08-17 RX ORDER — TRIAMCINOLONE ACETONIDE 1 MG/G
CREAM TOPICAL
Qty: 80 G | Refills: 0 | Status: SHIPPED | OUTPATIENT
Start: 2018-08-17 | End: 2022-02-04 | Stop reason: ALTCHOICE

## 2018-08-17 RX ORDER — IBUPROFEN 800 MG/1
TABLET ORAL
Refills: 1 | COMMUNITY
Start: 2018-07-16 | End: 2018-08-17 | Stop reason: SDUPTHER

## 2018-08-17 NOTE — PROGRESS NOTES
Chief Complaint   Patient presents with    Cyst     underarm left       Depression Screening:  PHQ over the last two weeks 8/17/2018   Little interest or pleasure in doing things More than half the days   Feeling down, depressed, irritable, or hopeless More than half the days   Total Score PHQ 2 4   Trouble falling or staying asleep, or sleeping too much Not at all   Feeling tired or having little energy Several days   Poor appetite, weight loss, or overeating Not at all   Feeling bad about yourself - or that you are a failure or have let yourself or your family down More than half the days   Trouble concentrating on things such as school, work, reading, or watching TV Not at all   Moving or speaking so slowly that other people could have noticed; or the opposite being so fidgety that others notice Not at all   Thoughts of being better off dead, or hurting yourself in some way Not at all   PHQ 9 Score 7       Learning Assessment:  No flowsheet data found. Abuse Screening:  No flowsheet data found. Fall Risk  Fall Risk Assessment, last 12 mths 8/17/2018   Able to walk? Yes   Fall in past 12 months? No       1. Have you been to the ER, urgent care clinic since your last visit? Hospitalized since your last visit? No    2. Have you seen or consulted any other health care providers outside of the [address] [place] [address] since your last visit? Include any pap smears or colon screening.  No

## 2018-08-21 NOTE — PROGRESS NOTES
The patient presents to the office today with the chief complaint of nodule left axilla    HPI    The patient complains of 6 months of a nodule in his left axilla. The size of the nodule is not changing. The patient has coronary artery disease. He denies chest pain or dyspnea. The patient has depression. This is stable on Zoloft. The patient persists with pain and stiffness in his knees      Review of Systems   Respiratory: Negative for shortness of breath. Cardiovascular: Negative for chest pain and leg swelling. Allergies   Allergen Reactions    Lisinopril Other (comments)     Burning    Codeine Other (comments)     Pt reports manic reactions.  Droperidol Nausea Only       Current Outpatient Prescriptions   Medication Sig Dispense Refill    triamcinolone acetonide (KENALOG) 0.1 % topical cream Apply twice per day to rash as needed 80 g 0    ibuprofen (ADVIL) 200 mg tablet Take  by mouth.  ASPIRIN (ASPIR-81 PO) Take  by mouth.  sertraline (ZOLOFT) 25 mg tablet Take 1 Tab by mouth daily. 30 Tab 5       History reviewed. No pertinent past medical history. Past Surgical History:   Procedure Laterality Date    CARDIAC SURG PROCEDURE UNLIST      Quad bipass surgery 2014       Social History     Social History    Marital status: UNKNOWN     Spouse name: N/A    Number of children: N/A    Years of education: N/A     Occupational History    Not on file.      Social History Main Topics    Smoking status: Never Smoker    Smokeless tobacco: Never Used    Alcohol use Yes      Comment: Occationaly    Drug use: No    Sexual activity: Not on file     Other Topics Concern    Not on file     Social History Narrative       Patient does not have an advanced directive on file    Visit Vitals    /68 (BP 1 Location: Right arm, BP Patient Position: Sitting)    Pulse 68    Temp 97.4 °F (36.3 °C) (Tympanic)    Resp 18    Ht 5' 9\" (1.753 m)    Wt 150 lb (68 kg)    SpO2 97%    BMI 22.15 kg/m2       Physical Exam   No Cervical Lymphadenopathy  No Supraclavicular Lymphadenopathy  Left axilla - 2.5 cm hard nodule left axilla  Thyroid is Normal  Lungs are normal to percussion. Clear to auscultation   Heart:  S1 S2 are normal, No gallops, No mummers  No Carotid Bruits  Abdomen:  Normal Bowel Sounds. No tenderness. No masses. No Hepatomegaly or Splenomegly  LE:  Strong Pedal Pulses. No Edema  Both knees: Moderate osteoarthitis      BMI:  OK    No visits with results within 3 Month(s) from this visit. Latest known visit with results is:    Hospital Outpatient Visit on 08/29/2017   Component Date Value Ref Range Status    WBC 08/29/2017 8.5  4.6 - 13.2 K/uL Final    RBC 08/29/2017 4.93  4.70 - 5.50 M/uL Final    HGB 08/29/2017 16.2* 13.0 - 16.0 g/dL Final    HCT 08/29/2017 47.9  36.0 - 48.0 % Final    MCV 08/29/2017 97.2* 74.0 - 97.0 FL Final    MCH 08/29/2017 32.9  24.0 - 34.0 PG Final    MCHC 08/29/2017 33.8  31.0 - 37.0 g/dL Final    RDW 08/29/2017 13.2  11.6 - 14.5 % Final    PLATELET 08/39/6405 952  135 - 420 K/uL Final    MPV 08/29/2017 10.0  9.2 - 11.8 FL Final    NEUTROPHILS 08/29/2017 78* 40 - 73 % Final    LYMPHOCYTES 08/29/2017 14* 21 - 52 % Final    MONOCYTES 08/29/2017 6  3 - 10 % Final    EOSINOPHILS 08/29/2017 1  0 - 5 % Final    BASOPHILS 08/29/2017 1  0 - 2 % Final    ABS. NEUTROPHILS 08/29/2017 6.7  1.8 - 8.0 K/UL Final    ABS. LYMPHOCYTES 08/29/2017 1.2  0.9 - 3.6 K/UL Final    ABS. MONOCYTES 08/29/2017 0.5  0.05 - 1.2 K/UL Final    ABS. EOSINOPHILS 08/29/2017 0.1  0.0 - 0.4 K/UL Final    ABS.  BASOPHILS 08/29/2017 0.1* 0.0 - 0.06 K/UL Final    DF 08/29/2017 AUTOMATED    Final    Sodium 08/29/2017 135* 136 - 145 mmol/L Final    Potassium 08/29/2017 4.9  3.5 - 5.5 mmol/L Final    Chloride 08/29/2017 98* 100 - 108 mmol/L Final    CO2 08/29/2017 30  21 - 32 mmol/L Final    Anion gap 08/29/2017 7  3.0 - 18 mmol/L Final    Glucose 08/29/2017 91  74 - 99 mg/dL Final    BUN 08/29/2017 18  7.0 - 18 MG/DL Final    Creatinine 08/29/2017 0.91  0.6 - 1.3 MG/DL Final    BUN/Creatinine ratio 08/29/2017 20  12 - 20   Final    GFR est AA 08/29/2017 >60  >60 ml/min/1.73m2 Final    GFR est non-AA 08/29/2017 >60  >60 ml/min/1.73m2 Final    Comment: (NOTE)  Estimated GFR is calculated using the Modification of Diet in Renal   Disease (MDRD) Study equation, reported for both  Americans   (GFRAA) and non- Americans (GFRNA), and normalized to 1.73m2   body surface area. The physician must decide which value applies to   the patient. The MDRD study equation should only be used in   individuals age 25 or older. It has not been validated for the   following: pregnant women, patients with serious comorbid conditions,   or on certain medications, or persons with extremes of body size,   muscle mass, or nutritional status.  Calcium 08/29/2017 9.4  8.5 - 10.1 MG/DL Final       .No results found for any visits on 08/17/18. Assessment / Plan      ICD-10-CM ICD-9-CM    1. Sebaceous cyst L72.3 706.2 REFERRAL TO GENERAL SURGERY   2. Moderate single current episode of major depressive disorder (HCC) F32.1 296.22    3. Primary osteoarthritis of both knees M17.0 715.16        To general surgery for removal of the cyst  he was advised to continue his maintenance medications      Follow-up Disposition:  Return in about 6 months (around 2/17/2019). I asked Carlyn Bradley if he has any questions and I answered the questions.   Carlyn Bradley states that he understands the treatment plan and agrees with the treatment plan

## 2018-10-05 ENCOUNTER — OFFICE VISIT (OUTPATIENT)
Dept: INTERNAL MEDICINE CLINIC | Age: 73
End: 2018-10-05

## 2018-10-05 ENCOUNTER — TELEPHONE (OUTPATIENT)
Dept: INTERNAL MEDICINE CLINIC | Age: 73
End: 2018-10-05

## 2018-10-05 VITALS
OXYGEN SATURATION: 95 % | HEART RATE: 72 BPM | DIASTOLIC BLOOD PRESSURE: 78 MMHG | RESPIRATION RATE: 16 BRPM | WEIGHT: 151 LBS | BODY MASS INDEX: 22.36 KG/M2 | SYSTOLIC BLOOD PRESSURE: 142 MMHG | TEMPERATURE: 98.5 F | HEIGHT: 69 IN

## 2018-10-05 DIAGNOSIS — Z98.890 S/P TENDON REPAIR: Primary | ICD-10-CM

## 2018-10-05 DIAGNOSIS — T63.444D BEE STING, UNDETERMINED INTENT, SUBSEQUENT ENCOUNTER: Primary | ICD-10-CM

## 2018-10-05 RX ORDER — PREDNISONE 20 MG/1
TABLET ORAL
Qty: 8 TAB | Refills: 0 | Status: SHIPPED | OUTPATIENT
Start: 2018-10-05 | End: 2020-02-07 | Stop reason: ALTCHOICE

## 2018-10-05 RX ORDER — CEPHALEXIN 250 MG/1
250 CAPSULE ORAL 4 TIMES DAILY
Qty: 28 CAP | Refills: 0 | Status: SHIPPED | OUTPATIENT
Start: 2018-10-05 | End: 2018-10-12

## 2018-10-05 NOTE — MR AVS SNAPSHOT
[address] [place] [address], [address] [facility] [address] 
[phone] Patient: Jeyson Kidd MRN: [id number] :1945 Visit Information Date & Time Provider Department Dept. Phone Encounter #  
 10/5/2018 11:15 AM Van Manzano NP [facility] OF [facility] [phone] [id number] Upcoming Health Maintenance Date Due DTaP/Tdap/Td series (1 - Tdap) 1965 Shingrix Vaccine Age 50> (1 of 2) 1994 FOBT Q 1 YEAR AGE 50-75 1994 GLAUCOMA SCREENING Q2Y 2009 Pneumococcal 65+ Low/Medium Risk (2 of 2 - PPSV23) 7/10/2016 MEDICARE YEARLY EXAM 3/14/2018 Influenza Age 5 to Adult 2018 Allergies as of 10/5/2018  Review Complete On: 10/5/2018 By: Sandy Gutierrez LPN Severity Noted Reaction Type Reactions Lisinopril Medium 2017    Other (comments) Burning Codeine  2017    Other (comments) Pt reports manic reactions. Droperidol  2017    Nausea Only Current Immunizations  Never Reviewed Name Date Influenza Vaccine Erin Levo) 1/15/2018 Pneumococcal Conjugate (PCV-13) 7/10/2015 12:00 AM  
  
 Not reviewed this visit You Were Diagnosed With   
  
 Codes Comments Bee sting, undetermined intent, subsequent encounter    -  Primary ICD-10-CM: T63.444D ICD-9-CM: V58.89, 989.5 Vitals BP Pulse Temp Resp Height(growth percentile) 142/78 (BP 1 Location: Left arm, BP Patient Position: Sitting) 72 98.5 °F (36.9 °C) (Tympanic) 16 5' 9\" (1.753 m) Weight(growth percentile) SpO2 BMI Smoking Status 151 lb (68.5 kg) 95% 22.3 kg/m2 Never Smoker BMI and BSA Data Body Mass Index Body Surface Area  
 22.3 kg/m 2 1.83 m 2 Preferred Pharmacy Pharmacy Name Phone [facility] [address] [phone] Your Updated Medication List  
  
   
 This list is accurate as of 10/5/18 12:15 PM.  Always use your most recent med list. ADVIL 200 mg tablet Generic drug:  ibuprofen Take  by mouth. ASPIR-81 PO Take  by mouth. cephALEXin 250 mg capsule Commonly known as:  Beverlie Shan Take 1 Cap by mouth four (4) times daily for 7 days. predniSONE 20 mg tablet Commonly known as:  DELTASONE  
2 tabs daily x 2 days, 1 tab daily x 2 days, 1/2 tab daily x 4 days  
  
 sertraline 25 mg tablet Commonly known as:  ZOLOFT Take 1 Tab by mouth daily. triamcinolone acetonide 0.1 % topical cream  
Commonly known as:  KENALOG Apply twice per day to rash as needed Prescriptions Sent to Pharmacy Refills  
 cephALEXin (KEFLEX) 250 mg capsule 0 Sig: Take 1 Cap by mouth four (4) times daily for 7 days. Class: Normal  
 Pharmacy: [address] Ph #: [phone] Route: Oral  
 predniSONE (DELTASONE) 20 mg tablet 0 Si tabs daily x 2 days, 1 tab daily x 2 days, 1/2 tab daily x 4 days Class: Normal  
 Pharmacy: [address] Ph #: [phone] Introducing [facility]! [facility] introduces SNTMNT patient portal. Now you can access parts of your medical record, email your doctor's office, and request medication refills online. 1. In your internet browser, go to [email or web address] 2. Click on the First Time User? Click Here link in the Sign In box. You will see the New Member Sign Up page. 3. Enter your SNTMNT Access Code exactly as it appears below. You will not need to use this code after youve completed the sign-up process. If you do not sign up before the expiration date, you must request a new code. · SNTMNT Access Code: [email or web address] Expires: 2018 12:06 PM 
 
 4. Enter the last four digits of your Social Security Number (xxxx) and Date of Birth (mm/dd/yyyy) as indicated and click Submit. You will be taken to the next sign-up page. 5. Create a Perfect Market ID. This will be your Perfect Market login ID and cannot be changed, so think of one that is secure and easy to remember. 6. Create a Perfect Market password. You can change your password at any time. 7. Enter your Password Reset Question and Answer. This can be used at a later time if you forget your password. 8. Enter your e-mail address. You will receive e-mail notification when new information is available in [address] 9. Click Sign Up. You can now view and download portions of your medical record. 10. Click the Download Summary menu link to download a portable copy of your medical information. If you have questions, please visit the Frequently Asked Questions section of the Perfect Market website. Remember, Perfect Market is NOT to be used for urgent needs. For medical emergencies, dial 911. Now available from your iPhone and Android! Please provide this summary of care documentation to your next provider. Your primary care clinician is listed as Carmen Clifton. If you have any questions after today's visit, please call [phone].

## 2018-10-05 NOTE — PROGRESS NOTES
Lise Gomez is a 75 y.o. male presenting today for Bee sting  . HPI:  Lise Gomez presents to the office today for bee sting to the left hand times 2 days ago. Patient reports he was cutting grass but he believes he was stung by a wasp. He believes this finger has been removed he has had hand edema and pain. He reports pain 1 out of 10 visit today he is negative for fever. Review of Systems   Respiratory: Negative for cough. Cardiovascular: Negative for chest pain and palpitations. Musculoskeletal: Positive for myalgias. Allergies   Allergen Reactions    Lisinopril Other (comments)     Burning    Codeine Other (comments)     Pt reports manic reactions.  Droperidol Nausea Only       Current Outpatient Prescriptions   Medication Sig Dispense Refill    cephALEXin (KEFLEX) 250 mg capsule Take 1 Cap by mouth four (4) times daily for 7 days. 28 Cap 0    predniSONE (DELTASONE) 20 mg tablet 2 tabs daily x 2 days, 1 tab daily x 2 days, 1/2 tab daily x 4 days 8 Tab 0    triamcinolone acetonide (KENALOG) 0.1 % topical cream Apply twice per day to rash as needed 80 g 0    ibuprofen (ADVIL) 200 mg tablet Take  by mouth.  ASPIRIN (ASPIR-81 PO) Take  by mouth.  sertraline (ZOLOFT) 25 mg tablet Take 1 Tab by mouth daily. 30 Tab 5       History reviewed. No pertinent past medical history. Past Surgical History:   Procedure Laterality Date    CARDIAC SURG PROCEDURE UNLIST      Quad bipass surgery 2014       Social History     Social History    Marital status: UNKNOWN     Spouse name: N/A    Number of children: N/A    Years of education: N/A     Occupational History    Not on file.      Social History Main Topics    Smoking status: Never Smoker    Smokeless tobacco: Never Used    Alcohol use Yes      Comment: Occationaly    Drug use: No    Sexual activity: Yes     Partners: Female     Other Topics Concern    Not on file     Social History Narrative       Patient does not have an advanced directive on file    Vitals:    10/05/18 1142   BP: 142/78   Pulse: 72   Resp: 16   Temp: 98.5 °F (36.9 °C)   TempSrc: Tympanic   SpO2: 95%   Weight: 151 lb (68.5 kg)   Height: 5' 9\" (1.753 m)   PainSc:   1   PainLoc: Hand       Physical Exam   Cardiovascular: Normal rate, regular rhythm and normal heart sounds. Pulmonary/Chest: Effort normal and breath sounds normal.   Musculoskeletal: He exhibits edema and tenderness. Arms:  Nursing note and vitals reviewed. No visits with results within 3 Month(s) from this visit. Latest known visit with results is:    Hospital Outpatient Visit on 08/29/2017   Component Date Value Ref Range Status    WBC 08/29/2017 8.5  4.6 - 13.2 K/uL Final    RBC 08/29/2017 4.93  4.70 - 5.50 M/uL Final    HGB 08/29/2017 16.2* 13.0 - 16.0 g/dL Final    HCT 08/29/2017 47.9  36.0 - 48.0 % Final    MCV 08/29/2017 97.2* 74.0 - 97.0 FL Final    MCH 08/29/2017 32.9  24.0 - 34.0 PG Final    MCHC 08/29/2017 33.8  31.0 - 37.0 g/dL Final    RDW 08/29/2017 13.2  11.6 - 14.5 % Final    PLATELET 37/89/7374 003  135 - 420 K/uL Final    MPV 08/29/2017 10.0  9.2 - 11.8 FL Final    NEUTROPHILS 08/29/2017 78* 40 - 73 % Final    LYMPHOCYTES 08/29/2017 14* 21 - 52 % Final    MONOCYTES 08/29/2017 6  3 - 10 % Final    EOSINOPHILS 08/29/2017 1  0 - 5 % Final    BASOPHILS 08/29/2017 1  0 - 2 % Final    ABS. NEUTROPHILS 08/29/2017 6.7  1.8 - 8.0 K/UL Final    ABS. LYMPHOCYTES 08/29/2017 1.2  0.9 - 3.6 K/UL Final    ABS. MONOCYTES 08/29/2017 0.5  0.05 - 1.2 K/UL Final    ABS. EOSINOPHILS 08/29/2017 0.1  0.0 - 0.4 K/UL Final    ABS.  BASOPHILS 08/29/2017 0.1* 0.0 - 0.06 K/UL Final    DF 08/29/2017 AUTOMATED    Final    Sodium 08/29/2017 135* 136 - 145 mmol/L Final    Potassium 08/29/2017 4.9  3.5 - 5.5 mmol/L Final    Chloride 08/29/2017 98* 100 - 108 mmol/L Final    CO2 08/29/2017 30  21 - 32 mmol/L Final    Anion gap 08/29/2017 7  3.0 - 18 mmol/L Final    Glucose 08/29/2017 91  74 - 99 mg/dL Final    BUN 08/29/2017 18  7.0 - 18 MG/DL Final    Creatinine 08/29/2017 0.91  0.6 - 1.3 MG/DL Final    BUN/Creatinine ratio 08/29/2017 20  12 - 20   Final    GFR est AA 08/29/2017 >60  >60 ml/min/1.73m2 Final    GFR est non-AA 08/29/2017 >60  >60 ml/min/1.73m2 Final    Comment: (NOTE)  Estimated GFR is calculated using the Modification of Diet in Renal   Disease (MDRD) Study equation, reported for both  Americans   (GFRAA) and non- Americans (GFRNA), and normalized to 1.73m2   body surface area. The physician must decide which value applies to   the patient. The MDRD study equation should only be used in   individuals age 25 or older. It has not been validated for the   following: pregnant women, patients with serious comorbid conditions,   or on certain medications, or persons with extremes of body size,   muscle mass, or nutritional status.  Calcium 08/29/2017 9.4  8.5 - 10.1 MG/DL Final       .No results found for any visits on 10/05/18. Assessment / Plan:      ICD-10-CM ICD-9-CM    1. Bee sting, undetermined intent, subsequent encounter T63.444D V58.89 cephALEXin (KEFLEX) 250 mg capsule     989.5 predniSONE (DELTASONE) 20 mg tablet     Keflex 250 mg 4 times daily times 7 days  Prednisone 20 mg taper dose given. Elevate extremity and use cool compresses to the left hand  Patient instructed to follow-up if no improvement in the left hand      Follow-up Disposition:  Return if symptoms worsen or fail to improve. I asked the patient if he  had any questions and answered his  questions.   The patient stated that he understands the treatment plan and agrees with the treatment plan

## 2018-10-05 NOTE — TELEPHONE ENCOUNTER
Patient would like for Dr Yu Sullivan to put in another order for physical therapy. He was doing PT for a tendon repair surgery he had and needs more therapy. Please advise.

## 2018-10-10 NOTE — TELEPHONE ENCOUNTER
Patient called back today regarding PT referral.  Please put in order for physical therapy. Patient needs to get started on therapy.

## 2018-10-17 DIAGNOSIS — Z98.890 STATUS POST TENDON REPAIR: Primary | ICD-10-CM

## 2018-10-18 ENCOUNTER — HOSPITAL ENCOUNTER (OUTPATIENT)
Dept: PHYSICAL THERAPY | Age: 73
Discharge: HOME | End: 2018-10-18
Payer: MEDICARE

## 2018-10-18 PROCEDURE — 97162 PT EVAL MOD COMPLEX 30 MIN: CPT

## 2018-10-18 PROCEDURE — 97140 MANUAL THERAPY 1/> REGIONS: CPT

## 2018-10-18 PROCEDURE — G8978 MOBILITY CURRENT STATUS: HCPCS

## 2018-10-18 PROCEDURE — 97110 THERAPEUTIC EXERCISES: CPT

## 2018-10-18 PROCEDURE — G8979 MOBILITY GOAL STATUS: HCPCS

## 2018-10-18 NOTE — PROGRESS NOTES
[facility] [place]  Carlee Evangelina [address]  [phone] fax  Plan of Care/ Statement of Necessity for Physical Therapy Services    Patient name: Lise Gomez Start of Care: 10/18/2018   Referral source: Parminder Fernández MD : 1945    Medical Diagnosis: Pain in right knee [M25.561]   Onset Date:10/17/18    Treatment Diagnosis: Right Quadriceps/Thigh pain   Prior Hospitalization: see medical history Provider#: [id number]   Medications: Verified on Patient summary List    Comorbidities: Angina; Heart disease   Prior Level of Function:  - court 2 days/wk; member of [employer] - 4-5 days/wk; walks 1 mile daily; lives in [place] with spouse; functionally independent      The Plan of Care and following information is based on the information from the initial evaluation. Assessment/ key information: Pt is a 75 y.o. male who presents with c/o continued right quadriceps soreness and tightness that impedes daily activities. Pt suffered right quadriceps tear in 2016, resulting in surgical repair on 16. Pt has had multiple bouts of physical therapy since due to continued soreness once Pt ramps up activity back to pre-injury levels. Pt able to perform all functional tasks but notes increased tightness the following day after increased activity as well as difficulty negotiating stairs in home - step to pattern, that produces apprehension of re-injury and is affecting quality of life. Right quadriceps completely healed and no ROM, strength, or stability noted. However, severe inflexibility noted in B quadriceps, HS, and ITB with presence of soft tissue restrictions, right > left. Pt admits no flexibility training independently. Pt would benefit from skilled PT to address above deficits to improve Pt's function and ability to return to active lifestyle without restrictions or difficulty.     Evaluation Complexity History MEDIUM Complexity : 1-2 comorbidities / personal factors will impact the outcome/ POC ; Examination MEDIUM Complexity : 3 Standardized tests and measures addressing body structure, function, activity limitation and / or participation in recreation  ;Presentation LOW Complexity : Stable, uncomplicated  ;Clinical Decision Making MEDIUM Complexity : FOTO score of 26-74  Overall Complexity Rating: MEDIUM  Problem List: pain affecting function, decrease ROM, impaired gait/ balance, decrease ADL/ functional abilitiies, decrease activity tolerance and decrease flexibility/ joint mobility   Treatment Plan may include any combination of the following: Therapeutic exercise, Therapeutic activities, Neuromuscular re-education, Physical agent/modality, Gait/balance training, Manual therapy, Patient education and Stair training  Patient / Family readiness to learn indicated by: asking questions, trying to perform skills and interest  Persons(s) to be included in education: patient (P)  Barriers to Learning/Limitations: None  Patient Goal (s): Motion and being able to walk up and down steps.   Patient Self Reported Health Status: good  Rehabilitation Potential: good    Short Term Goals: To be accomplished in 1 weeks:  Goal: Pt to be compliant with initial HEP to improve B LE flexibility. Status at last note/certification: Established and reviewed with Pt  Long Term Goals: To be accomplished in 5 weeks:  Goal: Pt to negotiate stairs with reciprocal pattern consistently without apprehension, discomfort for ease navigating home. Status at last note/certification: step to pattern  Goal: Pt to report 75% improvement in overall symptoms to return to pre-injury activity levels without difficulty. Status at last note/certification: N/A  Goal: Pt to report FOTO score of 60 pts to show improved function and quality of life.   Status at last note/certification: FOTO 42 pts    Frequency / Duration: Patient to be seen 2 times per week for 5 weeks. Patient/ Caregiver education and instruction: Diagnosis, prognosis, exercises   [x]  Plan of care has been reviewed with PTA    G-Codes (GP)  Mobility   Current  CK= 40-59%   Goal  CK= 40-59%    The severity rating is based on clinical judgment and the FOTO score. Certification Period: 10/18/18 - 11/16/18  Renita Wolfe, PT 10/18/2018 7:00 PM  _____________________________________________________________________  I certify that the above Therapy Services are being furnished while the patient is under my care. I agree with the treatment plan and certify that this therapy is necessary.     Physician's Signature:____________Date:_________TIME:________    ** Signature, Date and Time must be completed for valid certification **    Please sign and return to [facility] - [address]  [phone] fax

## 2018-10-18 NOTE — PROGRESS NOTES
PT DAILY TREATMENT NOTE 10-18    Patient Name: Rob Aase  Date:10/18/2018  : 1945  [x]  Patient  Verified  Payor: Carleen Roach / Plan: [place] / Product Type: Medicare /    In time:4:15  Out time:4:50  Total Treatment Time (min): 35  Visit #: 1 of 10      Medicare/BCBS Only   Total Timed Codes (min):  23 1:1 Treatment Time:  12       Treatment Area: Pain in right knee [M25.561]     SUBJECTIVE  Pain Level (0-10 scale): 1/10  Any medication changes, allergies to medications, adverse drug reactions, diagnosis change, or new procedure performed?: [x] No    [] Yes (see summary sheet for update)  Subjective functional status/changes:   [x] See paper initial evaluation form in patient chart. OBJECTIVE    12 min [x]Eval                  []Re-Eval     15 min Therapeutic Exercise:  [] See flow sheet : HEP given and reviewed with Pt   Rationale: increase ROM to improve the patients ability to increase B LE flexibility    8 min Manual Therapy:  STM to right quad   Rationale: increase tissue extensibility and decrease trigger points to reduce tightness discomfort with movements    With   [] TE   [] TA   [] neuro   [] other: Patient Education: [x] Review HEP    [] Progressed/Changed HEP based on:   [] positioning   [] body mechanics   [] transfers   [] heat/ice application    [] other:      Other Objective/Functional Measures: FOTO 42 pts     Pain Level (0-10 scale) post treatment: 1/10    ASSESSMENT/Changes in Function:     Patient will continue to benefit from skilled PT services to address functional mobility deficits, address ROM deficits, analyze and address soft tissue restrictions, analyze and cue movement patterns, analyze and modify body mechanics/ergonomics, address imbalance/dizziness and instruct in home and community integration to attain remaining goals.      [x]  See Plan of Care  []  See progress note/recertification  []  See Discharge Summary         PLAN  []  Upgrade activities as tolerated     [x]  Continue plan of care  []  Update interventions per flow sheet       []  Discharge due to:_  []  Other:_      Hermes Wolfe, LIZZY 10/18/2018  6:50 PM

## 2018-10-24 ENCOUNTER — TELEPHONE (OUTPATIENT)
Dept: INTERNAL MEDICINE CLINIC | Age: 73
End: 2018-10-24

## 2018-10-25 ENCOUNTER — HOSPITAL ENCOUNTER (OUTPATIENT)
Dept: PHYSICAL THERAPY | Age: 73
Discharge: HOME | End: 2018-10-25
Payer: MEDICARE

## 2018-10-25 PROCEDURE — 97110 THERAPEUTIC EXERCISES: CPT

## 2018-10-25 PROCEDURE — 97035 APP MDLTY 1+ULTRASOUND EA 15: CPT

## 2018-10-25 RX ORDER — IBUPROFEN 800 MG/1
800 TABLET ORAL 2 TIMES DAILY
Qty: 60 TAB | Refills: 3 | Status: SHIPPED | OUTPATIENT
Start: 2018-10-25 | End: 2019-05-24 | Stop reason: SDUPTHER

## 2018-10-25 NOTE — PROGRESS NOTES
PT DAILY TREATMENT NOTE 10-18    Patient Name: Caitlyn Adams  Date:10/25/2018  : 1945  [x]  Patient  Verified  Payor: Tana Delay / Plan: [place] MED[place]ARE PART A & B / Product Type: Medicare /    In time:3:30  Out time:4:15  Total Treatment Time (min): 45  Visit #: 2 of 10    Medicare/BCBS Only   Total Timed Codes (min):  45 1:1 Treatment Time:  45       Treatment Area: Pain in right knee [M25.561]    SUBJECTIVE  Pain Level (0-10 scale): 1/10  Any medication changes, allergies to medications, adverse drug reactions, diagnosis change, or new procedure performed?: [x] No    [] Yes (see summary sheet for update)  Subjective functional status/changes:   [] No changes reported  \"I'm just tight. \"    OBJECTIVE    Modality rationale: decrease inflammation, decrease pain and increase tissue extensibility to improve the patients ability to perform ADls with less difficulty.    Min Type Additional Details    [] Estim:  []Unatt       []IFC  []Premod                        []Other:  []w/ice   []w/heat  Position:  Location:    [] Estim: []Att    []TENS instruct  []NMES                    []Other:  []w/US   []w/ice   []w/heat  Position:  Location:    []  Traction: [] Cervical       []Lumbar                       [] Prone          []Supine                       []Intermittent   []Continuous Lbs:  [] before manual  [] after manual   8 [x]  Ultrasound: []Continuous   [x] Pulsed                           [x]1MHz   []3MHz W/cm2:1.3  Location:right distal quad    []  Iontophoresis with dexamethasone         Location: [] Take home patch   [] In clinic    []  Ice     []  heat  []  Ice massage  []  Laser   []  Anodyne Position:  Location:    []  Laser with stim  []  Other:  Position:  Location:    []  Vasopneumatic Device Pressure:       [] lo [] med [] hi   Temperature: [] lo [] med [] hi   [x] Skin assessment post-treatment:  [x]intact [x]redness- no adverse reaction    []redness - adverse reaction:     37 min Neuromuscular Re-education:  []  See flow sheet :dynamic warmup and pliates ex. Rationale: increase ROM and increase strength  to improve the patients ability to perform ADLs with less difficulty. With   [] TE   [] TA   [] neuro   [] other: Patient Education: [x] Review HEP    [] Progressed/Changed HEP based on:   [] positioning   [] body mechanics   [] transfers   [] heat/ice application    [] other:      Other Objective/Functional Measures:      Pain Level (0-10 scale) post treatment: 2/10    ASSESSMENT/Changes in Function: initiated ex. Per flow sheet. Pt required SBA with dynamic warmup. Patient will continue to benefit from skilled PT services to modify and progress therapeutic interventions, address functional mobility deficits, address ROM deficits, address strength deficits, analyze and address soft tissue restrictions and analyze and cue movement patterns to attain remaining goals. []  See Plan of Care  []  See progress note/recertification  []  See Discharge Summary         Progress towards goals / Updated goals:  Goal: Pt to be compliant with initial HEP to improve B LE flexibility. Status at last note/certification: Established and reviewed with Pt  Curremt: MET. Pt reports compliance. Long Term Goals: To be accomplished in 5 weeks:  Goal: Pt to negotiate stairs with reciprocal pattern consistently without apprehension, discomfort for ease navigating home. Status at last note/certification: step to pattern  Goal: Pt to report 75% improvement in overall symptoms to return to pre-injury activity levels without difficulty. Status at last note/certification: N/A  Goal: Pt to report FOTO score of 60 pts to show improved function and quality of life.   Status at last note/certification: FOTO 42 pts       PLAN  [x]  Upgrade activities as tolerated     [x]  Continue plan of care  []  Update interventions per flow sheet       []  Discharge due to:_  []  Other:_      Davis Her, CHRISSIE 10/25/2018  4:14 PM    Future Appointments   Date Time Provider Avis Greco   10/30/2018  3:30 PM [facility]

## 2018-10-25 NOTE — TELEPHONE ENCOUNTER
Patient states he take it for a Knee operation he had 1 1/2 years ago.  Rx sent to Dr. Saul Cooper for approval.

## 2018-10-26 ENCOUNTER — APPOINTMENT (OUTPATIENT)
Dept: PHYSICAL THERAPY | Age: 73
End: 2018-10-26
Payer: MEDICARE

## 2018-10-30 ENCOUNTER — HOSPITAL ENCOUNTER (OUTPATIENT)
Dept: PHYSICAL THERAPY | Age: 73
Discharge: HOME | End: 2018-10-30
Payer: MEDICARE

## 2018-10-30 PROCEDURE — 97112 NEUROMUSCULAR REEDUCATION: CPT

## 2018-10-30 PROCEDURE — 97035 APP MDLTY 1+ULTRASOUND EA 15: CPT

## 2018-10-30 NOTE — PROGRESS NOTES
PT DAILY TREATMENT NOTE 10-18    Patient Name: Vlad Meter  Date:10/30/2018  : 1945  [x]  Patient  Verified  Payor: [address] [place] [address] / Plan: [place] MEDICARE PART A & B / Product Type: Medicare /    In time:3:30  Out time:4:10  Total Treatment Time (min): 40  Visit #: 3 of 10    Medicare/BCBS Only   Total Timed Codes (min):  40 1:1 Treatment Time:  40       Treatment Area: Pain in right knee [M25.561]    SUBJECTIVE  Pain Level (0-10 scale): 1/10  Any medication changes, allergies to medications, adverse drug reactions, diagnosis change, or new procedure performed?: [x] No    [] Yes (see summary sheet for update)  Subjective functional status/changes:   [] No changes reported  \"I'm just tight. \"    OBJECTIVE    Modality rationale: decrease inflammation, decrease pain and increase tissue extensibility to improve the patients ability to perform ADls with less difficulty.    Min Type Additional Details    [] Estim:  []Unatt       []IFC  []Premod                        []Other:  []w/ice   []w/heat  Position:  Location:    [] Estim: []Att    []TENS instruct  []NMES                    []Other:  []w/US   []w/ice   []w/heat  Position:  Location:    []  Traction: [] Cervical       []Lumbar                       [] Prone          []Supine                       []Intermittent   []Continuous Lbs:  [] before manual  [] after manual   8 [x]  Ultrasound: []Continuous   [x] Pulsed                           [x]1MHz   []3MHz W/cm2:1.3  Location:right distal quad    []  Iontophoresis with dexamethasone         Location: [] Take home patch   [] In clinic    []  Ice     []  heat  []  Ice massage  []  Laser   []  Anodyne Position:  Location:    []  Laser with stim  []  Other:  Position:  Location:    []  Vasopneumatic Device Pressure:       [] lo [] med [] hi   Temperature: [] lo [] med [] hi   [x] Skin assessment post-treatment:  [x]intact [x]redness- no adverse reaction    []redness - adverse reaction:     32 min Neuromuscular Re-education:  []  See flow sheet : pliates ex. Rationale: increase ROM and increase strength  to improve the patients ability to perform ADLs with less difficulty. With   [] TE   [] TA   [] neuro   [] other: Patient Education: [x] Review HEP    [] Progressed/Changed HEP based on:   [] positioning   [] body mechanics   [] transfers   [] heat/ice application    [] other:      Other Objective/Functional Measures:      Pain Level (0-10 scale) post treatment: 0/10    ASSESSMENT/Changes in Function: Held dynamic warmup due to pt. Not being able to perform ex. Correctly. Added standing hip flexion stretch. Patient will continue to benefit from skilled PT services to modify and progress therapeutic interventions, address functional mobility deficits, address ROM deficits, address strength deficits, analyze and address soft tissue restrictions and analyze and cue movement patterns to attain remaining goals. []  See Plan of Care  []  See progress note/recertification  []  See Discharge Summary         Progress towards goals / Updated goals:  Goal: Pt to be compliant with initial HEP to improve B LE flexibility. Status at last note/certification: Established and reviewed with Pt  Curremt: MET. Pt reports compliance. Long Term Goals: To be accomplished in 5 weeks:  Goal: Pt to negotiate stairs with reciprocal pattern consistently without apprehension, discomfort for ease navigating home. Status at last note/certification: step to pattern  Goal: Pt to report 75% improvement in overall symptoms to return to pre-injury activity levels without difficulty. Status at last note/certification: N/A  Goal: Pt to report FOTO score of 60 pts to show improved function and quality of life.   Status at last note/certification: FOTO 42 pts       PLAN  [x]  Upgrade activities as tolerated     [x]  Continue plan of care  []  Update interventions per flow sheet       []  Discharge due to:_  []  Other:_      Dalila Lui Javier, CHRISSIE 10/30/2018  4:14 PM    No future appointments.

## 2018-11-05 ENCOUNTER — APPOINTMENT (OUTPATIENT)
Dept: PHYSICAL THERAPY | Age: 73
End: 2018-11-05

## 2018-11-06 ENCOUNTER — TELEPHONE (OUTPATIENT)
Dept: INTERNAL MEDICINE CLINIC | Age: 73
End: 2018-11-06

## 2018-11-06 NOTE — LETTER
18 To:  [facility] RE:  Deo Colbert :  1945 Please provide physical therapy on the right knee for Deo Colbert Diagnosis:  Right Knee Pain (M25.561) Thank you. Sincerely, Aubree De Dios M.D.   FACP

## 2018-11-06 NOTE — TELEPHONE ENCOUNTER
Patient called wanting a referral to go to [facility] to get a eval on knee.

## 2018-11-09 ENCOUNTER — APPOINTMENT (OUTPATIENT)
Dept: PHYSICAL THERAPY | Age: 73
End: 2018-11-09

## 2018-11-12 ENCOUNTER — APPOINTMENT (OUTPATIENT)
Dept: PHYSICAL THERAPY | Age: 73
End: 2018-11-12

## 2018-11-16 ENCOUNTER — APPOINTMENT (OUTPATIENT)
Dept: PHYSICAL THERAPY | Age: 73
End: 2018-11-16

## 2018-12-06 NOTE — PROGRESS NOTES
[facility], [address] [place] [address]  [phone] fax    Discharge Summary  Patient name: Hector Bain Start of Care: 10/18/2018   Referral source: Jordana Ceballos MD : 1945               Medical Diagnosis: Pain in right knee [M25.561]    Onset Date:10/17/18               Treatment Diagnosis: Right Quadriceps/Thigh pain   Prior Hospitalization: see medical history Provider#: [id number]   Medications: Verified on Patient summary List    Comorbidities: Angina; Heart disease   Prior Level of Function:  - court 2 days/wk; member of [employer] - 4-5 days/wk; walks 1 mile daily; lives in [place] with spouse; functionally independent    Visits from [facility]: 3    Missed Visits: 1    Reporting Period : 10/18/18 to 18    Short Term Goals: To be accomplished in 1 week:  Goal: Pt to be compliant with initial HEP to improve B LE flexibility. Status at last note/certification: Established and reviewed with Pt  Current status: met - Pt reports compliance with HEP   Long Term Goals: To be accomplished in 5 weeks:  Goal: Pt to negotiate stairs with reciprocal pattern consistently without apprehension, discomfort for ease navigating home. Status at last note/certification: step to pattern  Current status: not met - unable to reassess  Goal: Pt to report 75% improvement in overall symptoms to return to pre-injury activity levels without difficulty. Status at last note/certification: N/A  Current status: not met - unable to reassess  Goal: Pt to report FOTO score of 60 pts to show improved function and quality of life. Status at last note/certification: FOTO 42 pts  Current status: not met - unable to reassess    Assessment/ Summary of Care: Pt attended initial evaluation and two treatment sessions, then was placed on hold due to illness. Pt did not return for further appointments and was unable to be reached when contacted.   Thus, Pt will be D/C at this time due to clinic attendance policy. Goals were unable to be fully reassessed.   Thank you for this referral.    RECOMMENDATIONS:  [x]Discontinue therapy: []Patient has reached or is progressing toward set goals      [x]Patient is non-compliant or has abdicated      []Due to lack of appreciable progress towards set goals    Mao Wolfe, PT 12/6/2018 10:21 AM

## 2019-04-18 ENCOUNTER — OFFICE VISIT (OUTPATIENT)
Dept: FAMILY MEDICINE CLINIC | Facility: CLINIC | Age: 74
End: 2019-04-18

## 2019-04-18 VITALS
DIASTOLIC BLOOD PRESSURE: 64 MMHG | HEART RATE: 90 BPM | RESPIRATION RATE: 16 BRPM | BODY MASS INDEX: 22.51 KG/M2 | HEIGHT: 69 IN | OXYGEN SATURATION: 95 % | TEMPERATURE: 98 F | SYSTOLIC BLOOD PRESSURE: 142 MMHG | WEIGHT: 152 LBS

## 2019-04-18 DIAGNOSIS — J06.9 URI WITH COUGH AND CONGESTION: Primary | ICD-10-CM

## 2019-04-18 RX ORDER — AMOXICILLIN AND CLAVULANATE POTASSIUM 875; 125 MG/1; MG/1
1 TABLET, FILM COATED ORAL EVERY 12 HOURS
Qty: 14 TAB | Refills: 0 | Status: SHIPPED | OUTPATIENT
Start: 2019-04-18 | End: 2019-04-25

## 2019-04-18 RX ORDER — ACETAMINOPHEN 325 MG/1
TABLET ORAL
COMMUNITY

## 2019-04-18 NOTE — PROGRESS NOTES
Carlos Faulkner is a 75 y.o. male presenting today for Cough; Nasal Congestion; and Eye Drainage (right)  . HPI:  Carlos Faulkner presents to the office today for cough, nasal congestion, sinus pain and eye drainage x 1 week. Patient notes he has a history of sinus infections and he general gets infections every 3 years. He is afebrile and notes he takes Claritin nightly. He is negative for chest pain, palpation or dyspnea. He denies any wheezing or headache. Review of Systems   Constitutional: Negative for chills and fever. HENT: Positive for congestion and sinus pain. Negative for sore throat. Respiratory: Positive for cough and sputum production. Negative for wheezing. Cardiovascular: Negative for chest pain and palpitations. Neurological: Negative for headaches. Allergies   Allergen Reactions    Lisinopril Other (comments)     Burning    Codeine Other (comments)     Pt reports manic reactions.  Droperidol Nausea Only       Current Outpatient Medications   Medication Sig Dispense Refill    acetaminophen (TYLENOL) 325 mg tablet Take  by mouth every four (4) hours as needed for Pain.  amoxicillin-clavulanate (AUGMENTIN) 875-125 mg per tablet Take 1 Tab by mouth every twelve (12) hours for 7 days. 14 Tab 0    ibuprofen (MOTRIN) 800 mg tablet Take 1 Tab by mouth two (2) times a day. 60 Tab 3    triamcinolone acetonide (KENALOG) 0.1 % topical cream Apply twice per day to rash as needed 80 g 0    ASPIRIN (ASPIR-81 PO) Take  by mouth.  predniSONE (DELTASONE) 20 mg tablet 2 tabs daily x 2 days, 1 tab daily x 2 days, 1/2 tab daily x 4 days 8 Tab 0    sertraline (ZOLOFT) 25 mg tablet Take 1 Tab by mouth daily. 30 Tab 5    ibuprofen (ADVIL) 200 mg tablet Take  by mouth. No past medical history on file.     Past Surgical History:   Procedure Laterality Date    CARDIAC SURG PROCEDURE UNLIST      Quad bipass surgery 2014       Social History     Socioeconomic History    Marital status: UNKNOWN     Spouse name: Not on file    Number of children: Not on file    Years of education: Not on file    Highest education level: Not on file   Occupational History    Not on file   Social Needs    Financial resource strain: Not on file    Food insecurity:     Worry: Not on file     Inability: Not on file    Transportation needs:     Medical: Not on file     Non-medical: Not on file   Tobacco Use    Smoking status: Never Smoker    Smokeless tobacco: Never Used   Substance and Sexual Activity    Alcohol use: Yes     Comment: Occationaly    Drug use: No    Sexual activity: Yes     Partners: Female   Lifestyle    Physical activity:     Days per week: Not on file     Minutes per session: Not on file    Stress: Not on file   Relationships    Social connections:     Talks on phone: Not on file     Gets together: Not on file     Attends Taoist service: Not on file     Active member of club or organization: Not on file     Attends meetings of clubs or organizations: Not on file     Relationship status: Not on file    Intimate partner violence:     Fear of current or ex partner: Not on file     Emotionally abused: Not on file     Physically abused: Not on file     Forced sexual activity: Not on file   Other Topics Concern    Not on file   Social History Narrative    Not on file       Patient does not have an advanced directive on file    Vitals:    04/18/19 1022   BP: 142/64   Pulse: 90   Resp: 16   Temp: 98 °F (36.7 °C)   TempSrc: Tympanic   SpO2: 95%   Weight: 152 lb (68.9 kg)   Height: 5' 9\" (1.753 m)   PainSc:   0 - No pain       Physical Exam   Constitutional: No distress. HENT:   Right Ear: External ear normal.   Left Ear: External ear normal.   Mouth/Throat: No oropharyngeal exudate. Cardiovascular: Normal rate, regular rhythm and normal heart sounds. Pulmonary/Chest: Effort normal and breath sounds normal.   Lymphadenopathy:     He has no cervical adenopathy. Neurological: He is alert. Skin: Skin is warm. Nursing note and vitals reviewed. No visits with results within 3 Month(s) from this visit. Latest known visit with results is:   Hospital Outpatient Visit on 08/29/2017   Component Date Value Ref Range Status    WBC 08/29/2017 8.5  4.6 - 13.2 K/uL Final    RBC 08/29/2017 4.93  4.70 - 5.50 M/uL Final    HGB 08/29/2017 16.2* 13.0 - 16.0 g/dL Final    HCT 08/29/2017 47.9  36.0 - 48.0 % Final    MCV 08/29/2017 97.2* 74.0 - 97.0 FL Final    MCH 08/29/2017 32.9  24.0 - 34.0 PG Final    MCHC 08/29/2017 33.8  31.0 - 37.0 g/dL Final    RDW 08/29/2017 13.2  11.6 - 14.5 % Final    PLATELET 93/44/7037 203  135 - 420 K/uL Final    MPV 08/29/2017 10.0  9.2 - 11.8 FL Final    NEUTROPHILS 08/29/2017 78* 40 - 73 % Final    LYMPHOCYTES 08/29/2017 14* 21 - 52 % Final    MONOCYTES 08/29/2017 6  3 - 10 % Final    EOSINOPHILS 08/29/2017 1  0 - 5 % Final    BASOPHILS 08/29/2017 1  0 - 2 % Final    ABS. NEUTROPHILS 08/29/2017 6.7  1.8 - 8.0 K/UL Final    ABS. LYMPHOCYTES 08/29/2017 1.2  0.9 - 3.6 K/UL Final    ABS. MONOCYTES 08/29/2017 0.5  0.05 - 1.2 K/UL Final    ABS. EOSINOPHILS 08/29/2017 0.1  0.0 - 0.4 K/UL Final    ABS.  BASOPHILS 08/29/2017 0.1* 0.0 - 0.06 K/UL Final    DF 08/29/2017 AUTOMATED    Final    Sodium 08/29/2017 135* 136 - 145 mmol/L Final    Potassium 08/29/2017 4.9  3.5 - 5.5 mmol/L Final    Chloride 08/29/2017 98* 100 - 108 mmol/L Final    CO2 08/29/2017 30  21 - 32 mmol/L Final    Anion gap 08/29/2017 7  3.0 - 18 mmol/L Final    Glucose 08/29/2017 91  74 - 99 mg/dL Final    BUN 08/29/2017 18  7.0 - 18 MG/DL Final    Creatinine 08/29/2017 0.91  0.6 - 1.3 MG/DL Final    BUN/Creatinine ratio 08/29/2017 20  12 - 20   Final    GFR est AA 08/29/2017 >60  >60 ml/min/1.73m2 Final    GFR est non-AA 08/29/2017 >60  >60 ml/min/1.73m2 Final    Comment: (NOTE)  Estimated GFR is calculated using the Modification of Diet in Renal   Disease (MDRD) Study equation, reported for both  Americans   (GFRAA) and non- Americans (GFRNA), and normalized to 1.73m2   body surface area. The physician must decide which value applies to   the patient. The MDRD study equation should only be used in   individuals age 25 or older. It has not been validated for the   following: pregnant women, patients with serious comorbid conditions,   or on certain medications, or persons with extremes of body size,   muscle mass, or nutritional status.  Calcium 08/29/2017 9.4  8.5 - 10.1 MG/DL Final       .No results found for any visits on 04/18/19. Assessment / Plan:      ICD-10-CM ICD-9-CM    1. URI with cough and congestion J06.9 465.9 amoxicillin-clavulanate (AUGMENTIN) 875-125 mg per tablet     Continue claritin  Fluids  F/u prn          Follow-up and Dispositions    · Return if symptoms worsen or fail to improve. I asked the patient if he  had any questions and answered his  questions.   The patient stated that he understands the treatment plan and agrees with the treatment plan

## 2019-04-26 ENCOUNTER — TELEPHONE (OUTPATIENT)
Dept: FAMILY MEDICINE CLINIC | Facility: CLINIC | Age: 74
End: 2019-04-26

## 2019-04-26 DIAGNOSIS — J06.9 UPPER RESPIRATORY TRACT INFECTION, UNSPECIFIED TYPE: Primary | ICD-10-CM

## 2019-04-26 RX ORDER — AZITHROMYCIN 250 MG/1
TABLET, FILM COATED ORAL
Qty: 6 TAB | Refills: 0 | Status: SHIPPED | OUTPATIENT
Start: 2019-04-26 | End: 2020-02-07 | Stop reason: ALTCHOICE

## 2019-04-26 NOTE — TELEPHONE ENCOUNTER
Patient called he seen Bruce Mccarthy on the 18th and she gave him some medication and he has finished it but still not feeling well please advise thanks [phone]

## 2019-05-24 RX ORDER — IBUPROFEN 800 MG/1
TABLET ORAL
Qty: 60 TAB | Refills: 0 | Status: SHIPPED | OUTPATIENT
Start: 2019-05-24 | End: 2019-07-24 | Stop reason: SDUPTHER

## 2019-07-24 RX ORDER — IBUPROFEN 800 MG/1
TABLET ORAL
Qty: 60 TAB | Refills: 0 | Status: SHIPPED | OUTPATIENT
Start: 2019-07-24 | End: 2019-09-20 | Stop reason: SDUPTHER

## 2019-09-20 RX ORDER — IBUPROFEN 800 MG/1
TABLET ORAL
Qty: 60 TAB | Refills: 0 | Status: SHIPPED | OUTPATIENT
Start: 2019-09-20 | End: 2019-11-22 | Stop reason: SDUPTHER

## 2019-10-18 ENCOUNTER — CLINICAL SUPPORT (OUTPATIENT)
Dept: FAMILY MEDICINE CLINIC | Facility: CLINIC | Age: 74
End: 2019-10-18

## 2019-10-18 DIAGNOSIS — Z23 ENCOUNTER FOR IMMUNIZATION: ICD-10-CM

## 2019-11-22 RX ORDER — IBUPROFEN 800 MG/1
TABLET ORAL
Qty: 60 TAB | Refills: 0 | Status: SHIPPED | OUTPATIENT
Start: 2019-11-22 | End: 2020-06-24 | Stop reason: SDUPTHER

## 2020-02-07 ENCOUNTER — OFFICE VISIT (OUTPATIENT)
Dept: FAMILY MEDICINE CLINIC | Facility: CLINIC | Age: 75
End: 2020-02-07

## 2020-02-07 VITALS
TEMPERATURE: 97.3 F | BODY MASS INDEX: 23.55 KG/M2 | OXYGEN SATURATION: 96 % | HEIGHT: 69 IN | DIASTOLIC BLOOD PRESSURE: 82 MMHG | WEIGHT: 159 LBS | HEART RATE: 75 BPM | RESPIRATION RATE: 12 BRPM | SYSTOLIC BLOOD PRESSURE: 134 MMHG

## 2020-02-07 DIAGNOSIS — M25.561 CHRONIC PAIN OF RIGHT KNEE: Primary | ICD-10-CM

## 2020-02-07 DIAGNOSIS — H53.71 GLARE SENSITIVITY: ICD-10-CM

## 2020-02-07 DIAGNOSIS — G89.29 CHRONIC PAIN OF RIGHT KNEE: Primary | ICD-10-CM

## 2020-02-07 DIAGNOSIS — H53.9 VISION CHANGES: ICD-10-CM

## 2020-02-07 RX ORDER — ATORVASTATIN CALCIUM 40 MG/1
TABLET, FILM COATED ORAL DAILY
COMMUNITY
End: 2022-02-04 | Stop reason: ALTCHOICE

## 2020-02-07 NOTE — PROGRESS NOTES
Jennye Apley is a 67 y.o. male presenting today for Eye Problem (blurred vision and glare) and Leg Pain (wants referral to Physical Therapy)    HPI:  Jennye Apley presents to the office today for blurred vision and glare. Per the patient and his wife he is status post cataract surgery x3 years ago. She notes approximately 6 months ago he started complaining of a glare in his eyes. Per the patient he has always had a glare but the symptoms are progressively worsening. In fact he had his cataract surgery to clear improved. He was evaluated by ophthalmologist, Dr. Kirill Corona and was given Toradol as per the family he thought the problem was associated with his accommodation and closing his eyes to slowly. He continues to have problem with the glare and walks around with his left eyes closed all the time. He denies any eye pain, swelling or discharge. He is negative for any headache or dizziness. Review of Systems   Eyes:        Bilateral eye clear   Respiratory: Negative for cough and sputum production. Cardiovascular: Negative for chest pain and palpitations. Gastrointestinal: Negative for abdominal pain, nausea and vomiting. Neurological: Negative for dizziness and headaches. Allergies   Allergen Reactions    Lisinopril Other (comments)     Burning    Codeine Other (comments)     Pt reports manic reactions.  Droperidol Nausea Only       Current Outpatient Medications   Medication Sig Dispense Refill    atorvastatin (LIPITOR) 40 mg tablet Take  by mouth daily.  acetaminophen (TYLENOL) 325 mg tablet Take  by mouth every four (4) hours as needed for Pain.  ASPIRIN (ASPIR-81 PO) Take  by mouth.  ibuprofen (MOTRIN) 800 mg tablet TAKE 1 TABLET BY MOUTH TWICE DAILY 60 Tab 0    triamcinolone acetonide (KENALOG) 0.1 % topical cream Apply twice per day to rash as needed 80 g 0    sertraline (ZOLOFT) 25 mg tablet Take 1 Tab by mouth daily.  30 Tab 5    ibuprofen (ADVIL) 200 mg tablet Take  by mouth. History reviewed. No pertinent past medical history. Past Surgical History:   Procedure Laterality Date    CARDIAC SURG PROCEDURE UNLIST      Quad bipass surgery 2014       Social History     Socioeconomic History    Marital status: UNKNOWN     Spouse name: Not on file    Number of children: Not on file    Years of education: Not on file    Highest education level: Not on file   Occupational History    Not on file   Social Needs    Financial resource strain: Not on file    Food insecurity:     Worry: Not on file     Inability: Not on file    Transportation needs:     Medical: Not on file     Non-medical: Not on file   Tobacco Use    Smoking status: Never Smoker    Smokeless tobacco: Never Used   Substance and Sexual Activity    Alcohol use: Yes     Comment: Occationaly    Drug use: No    Sexual activity: Yes     Partners: Female   Lifestyle    Physical activity:     Days per week: Not on file     Minutes per session: Not on file    Stress: Not on file   Relationships    Social connections:     Talks on phone: Not on file     Gets together: Not on file     Attends Buddhist service: Not on file     Active member of club or organization: Not on file     Attends meetings of clubs or organizations: Not on file     Relationship status: Not on file    Intimate partner violence:     Fear of current or ex partner: Not on file     Emotionally abused: Not on file     Physically abused: Not on file     Forced sexual activity: Not on file   Other Topics Concern    Not on file   Social History Narrative    Not on file       Patient does not have an advanced directive on file    Vitals:    02/07/20 1347   BP: 134/82   Pulse: 75   Resp: 12   Temp: 97.3 °F (36.3 °C)   TempSrc: Tympanic   SpO2: 96%   Weight: 159 lb (72.1 kg)   Height: 5' 9\" (1.753 m)   PainSc:   1   PainLoc: Leg       Physical Exam  Vitals signs and nursing note reviewed.    Cardiovascular:      Rate and Rhythm: Normal rate and regular rhythm. Pulses: Normal pulses. Heart sounds: Normal heart sounds. Pulmonary:      Effort: Pulmonary effort is normal.      Breath sounds: Normal breath sounds. Skin:     General: Skin is warm and dry. Neurological:      Mental Status: He is alert. No visits with results within 3 Month(s) from this visit. Latest known visit with results is:   Hospital Outpatient Visit on 08/29/2017   Component Date Value Ref Range Status    WBC 08/29/2017 8.5  4.6 - 13.2 K/uL Final    RBC 08/29/2017 4.93  4.70 - 5.50 M/uL Final    HGB 08/29/2017 16.2* 13.0 - 16.0 g/dL Final    HCT 08/29/2017 47.9  36.0 - 48.0 % Final    MCV 08/29/2017 97.2* 74.0 - 97.0 FL Final    MCH 08/29/2017 32.9  24.0 - 34.0 PG Final    MCHC 08/29/2017 33.8  31.0 - 37.0 g/dL Final    RDW 08/29/2017 13.2  11.6 - 14.5 % Final    PLATELET 75/36/6404 971  135 - 420 K/uL Final    MPV 08/29/2017 10.0  9.2 - 11.8 FL Final    NEUTROPHILS 08/29/2017 78* 40 - 73 % Final    LYMPHOCYTES 08/29/2017 14* 21 - 52 % Final    MONOCYTES 08/29/2017 6  3 - 10 % Final    EOSINOPHILS 08/29/2017 1  0 - 5 % Final    BASOPHILS 08/29/2017 1  0 - 2 % Final    ABS. NEUTROPHILS 08/29/2017 6.7  1.8 - 8.0 K/UL Final    ABS. LYMPHOCYTES 08/29/2017 1.2  0.9 - 3.6 K/UL Final    ABS. MONOCYTES 08/29/2017 0.5  0.05 - 1.2 K/UL Final    ABS. EOSINOPHILS 08/29/2017 0.1  0.0 - 0.4 K/UL Final    ABS.  BASOPHILS 08/29/2017 0.1* 0.0 - 0.06 K/UL Final    DF 08/29/2017 AUTOMATED    Final    Sodium 08/29/2017 135* 136 - 145 mmol/L Final    Potassium 08/29/2017 4.9  3.5 - 5.5 mmol/L Final    Chloride 08/29/2017 98* 100 - 108 mmol/L Final    CO2 08/29/2017 30  21 - 32 mmol/L Final    Anion gap 08/29/2017 7  3.0 - 18 mmol/L Final    Glucose 08/29/2017 91  74 - 99 mg/dL Final    BUN 08/29/2017 18  7.0 - 18 MG/DL Final    Creatinine 08/29/2017 0.91  0.6 - 1.3 MG/DL Final    BUN/Creatinine ratio 08/29/2017 20  12 - 20   Final    GFR est AA 08/29/2017 >60  >60 ml/min/1.73m2 Final    GFR est non-AA 08/29/2017 >60  >60 ml/min/1.73m2 Final    Comment: (NOTE)  Estimated GFR is calculated using the Modification of Diet in Renal   Disease (MDRD) Study equation, reported for both  Americans   (GFRAA) and non- Americans (GFRNA), and normalized to 1.73m2   body surface area. The physician must decide which value applies to   the patient. The MDRD study equation should only be used in   individuals age 25 or older. It has not been validated for the   following: pregnant women, patients with serious comorbid conditions,   or on certain medications, or persons with extremes of body size,   muscle mass, or nutritional status.  Calcium 08/29/2017 9.4  8.5 - 10.1 MG/DL Final       .No results found for any visits on 02/07/20. Assessment / Plan:      ICD-10-CM ICD-9-CM    1. Chronic pain of right knee M25.561 719.46 REFERRAL TO PHYSICAL THERAPY    G89.29 338.29    2. Vision changes H53.9 368.9 CT HEAD W WO CONT   3. Glare sensitivity H53.71 368.8 CT HEAD W WO CONT     Chronic knee pain-referral to physical therapy  Spoke to patient's wife via telephone. Patient notes that the ophthalmologist noted that his blurred vision was not associated with his eyes. Would like CT of the head complete    Follow-up and Dispositions    · Return in about 3 months (around 5/7/2020). I asked the patient if he  had any questions and answered his  questions. The patient stated that he understands the treatment plan and agrees with the treatment plan    This document was created with a voice activated dictation system and may contain transcription errors.

## 2020-02-07 NOTE — PROGRESS NOTES
Aziza Ac presents today for   Chief Complaint   Patient presents with   [facility] Problem       Aziza Ac preferred language for health care discussion is english. Is someone accompanying this pt? no    Is the patient using any DME equipment during [address]? no    Depression Screening:  3 most recent PHQ Screens 2/7/2020   PHQ Not Done -   Little interest or pleasure in doing things Several days   Feeling down, depressed, irritable, or hopeless Several days   Total Score PHQ 2 2   Trouble falling or staying asleep, or sleeping too much -   Feeling tired or having little energy -   Poor appetite, weight loss, or overeating -   Feeling bad about yourself - or that you are a failure or have let yourself or your family down -   Trouble concentrating on things such as school, work, reading, or watching TV -   Moving or speaking so slowly that other people could have noticed; or the opposite being so fidgety that others notice -   Thoughts of being better off dead, or hurting yourself in some way -   PHQ 9 Score -       Learning Assessment:  No flowsheet data found. Abuse Screening:  No flowsheet data found. Fall Risk  Fall Risk Assessment, last 12 mths 2/7/2020   Able to walk? Yes   Fall in past 12 months? No       Health Maintenance reviewed and discussed and ordered per Provider. Health Maintenance Due   Topic Date Due    DTaP/Tdap/Td series (1 - Tdap) 02/12/1955    Shingrix Vaccine Age 50> (1 of 2) 02/12/1994    GLAUCOMA SCREENING Q2Y  02/12/2009    Pneumococcal 65+ years (2 of 2 - PPSV23) 07/10/2016    Medicare Yearly Exam  03/14/2018   . Aziza Ac is updated on all     Pt currently taking Antiplatelet therapy? no    Coordination of Care:  1. Have you been to the ER, urgent care clinic since your last visit? no Hospitalized since your last visit? no    2.  Have you seen or consulted any other health care providers outside of the [address] [place] [address] since your last visit? no Include any pap smears or colon screening.  no

## 2020-06-24 NOTE — TELEPHONE ENCOUNTER
Requested Prescriptions     Pending Prescriptions Disp Refills    ibuprofen (MOTRIN) 800 mg tablet 60 Tab 0

## 2020-06-29 RX ORDER — IBUPROFEN 800 MG/1
TABLET ORAL
Qty: 60 TAB | Refills: 0 | Status: SHIPPED | OUTPATIENT
Start: 2020-06-29 | End: 2020-08-27

## 2020-08-24 ENCOUNTER — OFFICE VISIT (OUTPATIENT)
Dept: FAMILY MEDICINE CLINIC | Facility: CLINIC | Age: 75
End: 2020-08-24

## 2020-08-24 DIAGNOSIS — Z00.00 MEDICARE ANNUAL WELLNESS VISIT, INITIAL: Primary | ICD-10-CM

## 2020-08-24 DIAGNOSIS — Z13.39 SCREENING FOR ALCOHOLISM: ICD-10-CM

## 2020-08-24 NOTE — PATIENT INSTRUCTIONS
Medicare Wellness Visit, Male    The best way to live healthy is to have a lifestyle where you eat a well-balanced diet, exercise regularly, limit alcohol use, and quit all forms of tobacco/nicotine, if applicable. Regular preventive services are another way to keep healthy. Preventive services (vaccines, screening tests, monitoring & exams) can help personalize your care plan, which helps you manage your own care. Screening tests can find health problems at the earliest stages, when they are easiest to treat. [facility] follows the current, evidence-based guidelines published by the [facility] ([facility]) when recommending preventive services for our patients. Because we follow these guidelines, sometimes recommendations change over time as research supports it. (For example, a prostate screening blood test is no longer routinely recommended for men with no symptoms). Of course, you and your doctor may decide to screen more often for some diseases, based on your risk and co-morbidities (chronic disease you are already diagnosed with). Preventive services for you include:  - Medicare offers their members a free annual wellness visit, which is time for you and your primary care provider to discuss and plan for your preventive service needs. Take advantage of this benefit every year!  -All adults over age 72 should receive the recommended pneumonia vaccines. Current USPSTF guidelines recommend a series of two vaccines for the best pneumonia protection.   -All adults should have a flu vaccine yearly and tetanus vaccine every 10 years.  -All adults age 48 and older should receive the shingles vaccines (series of two vaccines).        -All adults age 38-68 who are overweight should have a diabetes screening test once every three years.   -Other screening tests & preventive services for persons with diabetes include: an eye exam to screen for diabetic retinopathy, a kidney function test, a foot exam, and stricter control over your cholesterol.   -Cardiovascular screening for adults with routine risk involves an electrocardiogram (ECG) at intervals determined by the provider.   -Colorectal cancer screening should be done for adults age 54-65 with no increased risk factors for colorectal cancer. There are a number of acceptable methods of screening for this type of cancer. Each test has its own benefits and drawbacks. Discuss with your provider what is most appropriate for you during your annual wellness visit. The different tests include: colonoscopy (considered the best screening method), a fecal occult blood test, a fecal DNA test, and sigmoidoscopy.  -All adults born between [facility] should be screened once for Hepatitis C.  -An Abdominal Aortic Aneurysm (AAA) Screening is recommended for men age 73-68 who has ever smoked in their lifetime.      Here is a list of your current Health Maintenance items (your personalized list of preventive services) with a due date:  Health Maintenance Due   Topic Date Due    DTaP/Tdap/Td  (1 - Tdap) 02/12/1965    Shingles Vaccine (1 of 2) 02/12/1994    Glaucoma Screening   02/12/2009    Pneumococcal Vaccine (2 of 2 - PPSV23) 07/10/2016    Annual Well Visit  03/14/2018    Cholesterol Test   01/25/2019

## 2020-08-24 NOTE — PROGRESS NOTES
This is an Initial Medicare Annual Wellness Exam (AWV) (Performed 12 months after IPPE or effective date of Medicare Part B enrollment, Once in a lifetime)    I have reviewed the patient's medical history in detail and updated the computerized patient record. History     Patient Active Problem List   Diagnosis Code    Primary osteoarthritis of both knees M17.0    Coronary artery disease involving native heart without angina pectoris I25.10     No past medical history on file. Past Surgical History:   Procedure Laterality Date    CARDIAC SURG PROCEDURE UNLIST      Quad bipass surgery 2014     Current Outpatient Medications   Medication Sig Dispense Refill    ibuprofen (MOTRIN) 800 mg tablet TAKE 1 TABLET BY MOUTH TWICE DAILY 60 Tab 0    atorvastatin (LIPITOR) 40 mg tablet Take  by mouth daily.  acetaminophen (TYLENOL) 325 mg tablet Take  by mouth every four (4) hours as needed for Pain.  triamcinolone acetonide (KENALOG) 0.1 % topical cream Apply twice per day to rash as needed 80 g 0    sertraline (ZOLOFT) 25 mg tablet Take 1 Tab by mouth daily. 30 Tab 5    ibuprofen (ADVIL) 200 mg tablet Take  by mouth.  ASPIRIN (ASPIR-81 PO) Take  by mouth. Allergies   Allergen Reactions    Lisinopril Other (comments)     Burning    Codeine Other (comments)     Pt reports manic reactions.     Droperidol Nausea Only       Family History   Problem Relation Age of Onset    Heart Disease Mother     Heart Disease Father      Social History     Tobacco Use    Smoking status: Never Smoker    Smokeless tobacco: Never Used   Substance Use Topics    Alcohol use: Yes     Comment: Augustus       Depression Risk Factor Screening:     3 most recent PHQ Screens 2/7/2020   PHQ Not Done -   Little interest or pleasure in doing things Several days   Feeling down, depressed, irritable, or hopeless Several days   Total Score PHQ 2 2   Trouble falling or staying asleep, or sleeping too much -   Feeling tired or having little energy -   Poor appetite, weight loss, or overeating -   Feeling bad about yourself - or that you are a failure or have let yourself or your family down -   Trouble concentrating on things such as school, work, reading, or watching TV -   Moving or speaking so slowly that other people could have noticed; or the opposite being so fidgety that others notice -   Thoughts of being better off dead, or hurting yourself in some way -   PHQ 9 Score -       Alcohol Risk Factor Screening (MALE > 65): Do you average more 1 drink per night or more than 7 drinks a week: No    In the past three months have you have had more than 4 drinks containing alcohol on one occasion: No      Functional Ability and Level of Safety:   Hearing: The patient needs further evaluation. Activities of Daily Living: The home contains: handrails and grab bars  Patient does total self care     Ambulation: with no difficulty      Fall Risk:  Fall Risk Assessment, last 12 mths 2/7/2020   Able to walk? Yes   Fall in past 12 months? No     Abuse Screen:  Patient is not abused       Cognitive Screening   Has your family/caregiver stated any concerns about your memory: no     Cognitive Screening: Normal - having longterm memory loss    Patient Care Team   Patient Care Team:  Catarino Conti NP as PCP - General (Nurse Practitioner)  Catarino Conti NP as PCP - Smiley Navarrete Provider    Assessment/Plan   Education and counseling provided:  Are appropriate based on today's review and evaluation    Diagnoses and all orders for this visit:    1. Medicare annual wellness visit, initial    2.  Screening for alcoholism         Health Maintenance Due   Topic Date Due    DTaP/Tdap/Td series (1 - Tdap) 02/12/1965    Shingrix Vaccine Age 50> (1 of 2) 02/12/1994    GLAUCOMA SCREENING Q2Y  02/12/2009    Pneumococcal 65+ years (2 of 2 - PPSV23) 07/10/2016    Medicare Yearly Exam  03/14/2018    Lipid Screen  01/25/2019 Jeannine Fiore, who was evaluated through a synchronous (real-time) audio-video encounter, and/or his healthcare decision maker, is aware that it is a billable service, with coverage as determined by his insurance carrier. He provided verbal consent to proceed: Yes, and patient identification was verified. It was conducted pursuant to the emergency declaration under the [address] [place] [address], [address] [place] [address] and the [employer] and [employer] Act. A caregiver was present when appropriate. Ability to conduct physical exam was limited. I was at home. The patient was at home.       Nayeli Bey NP

## 2020-08-27 RX ORDER — IBUPROFEN 800 MG/1
TABLET ORAL
Qty: 60 TAB | Refills: 0 | Status: SHIPPED | OUTPATIENT
Start: 2020-08-27 | End: 2020-10-19

## 2020-10-19 RX ORDER — IBUPROFEN 800 MG/1
TABLET ORAL
Qty: 60 TAB | Refills: 0 | Status: SHIPPED | OUTPATIENT
Start: 2020-10-19 | End: 2021-01-04

## 2021-01-04 RX ORDER — IBUPROFEN 800 MG/1
TABLET ORAL
Qty: 60 TAB | Refills: 0 | Status: SHIPPED | OUTPATIENT
Start: 2021-01-04 | End: 2022-02-04 | Stop reason: ALTCHOICE

## 2022-02-04 PROBLEM — R63.4 ABNORMAL WEIGHT LOSS: Status: ACTIVE | Noted: 2022-02-04

## 2022-02-04 PROBLEM — G25.81 RESTLESS LEGS SYNDROME: Status: ACTIVE | Noted: 2021-02-02

## 2022-02-04 PROBLEM — F32.A DEPRESSION: Status: ACTIVE | Noted: 2021-02-02

## 2022-02-04 PROBLEM — D22.9 MELANOCYTIC NEVUS: Status: ACTIVE | Noted: 2021-02-02

## 2022-02-04 PROBLEM — I10 HYPERTENSION: Status: ACTIVE | Noted: 2021-05-04

## 2022-02-04 PROBLEM — J01.00 ACUTE NON-RECURRENT MAXILLARY SINUSITIS: Status: ACTIVE | Noted: 2021-06-16

## 2022-02-04 PROBLEM — N18.9 CHRONIC KIDNEY DISEASE: Status: ACTIVE | Noted: 2021-02-02

## 2022-02-04 PROBLEM — M35.3 POLYMYALGIA RHEUMATICA (HCC): Status: ACTIVE | Noted: 2021-02-02

## 2022-02-04 PROBLEM — E78.5 HYPERLIPIDEMIA: Status: ACTIVE | Noted: 2021-05-04

## 2022-02-04 PROBLEM — I65.29 CAROTID ARTERY STENOSIS: Status: ACTIVE | Noted: 2021-02-02
